# Patient Record
Sex: MALE | Race: WHITE | NOT HISPANIC OR LATINO | Employment: OTHER | URBAN - METROPOLITAN AREA
[De-identification: names, ages, dates, MRNs, and addresses within clinical notes are randomized per-mention and may not be internally consistent; named-entity substitution may affect disease eponyms.]

---

## 2017-02-28 PROBLEM — I38 VHD (VALVULAR HEART DISEASE): Status: ACTIVE | Noted: 2017-02-28

## 2017-02-28 PROBLEM — D69.6 THROMBOCYTOPENIA (HCC): Status: ACTIVE | Noted: 2017-02-28

## 2017-02-28 PROBLEM — J44.9 COPD (CHRONIC OBSTRUCTIVE PULMONARY DISEASE) (HCC): Status: ACTIVE | Noted: 2017-02-28

## 2017-02-28 PROBLEM — I10 HYPERTENSION: Status: ACTIVE | Noted: 2017-02-28

## 2017-02-28 PROBLEM — I48.92 ATRIAL FLUTTER (HCC): Status: ACTIVE | Noted: 2017-02-28

## 2017-02-28 PROBLEM — G47.33 OSA (OBSTRUCTIVE SLEEP APNEA): Status: ACTIVE | Noted: 2017-02-28

## 2017-02-28 PROBLEM — E78.5 DYSLIPIDEMIA: Status: ACTIVE | Noted: 2017-02-28

## 2017-02-28 PROBLEM — Z72.0 TOBACCO ABUSE: Status: ACTIVE | Noted: 2017-02-28

## 2017-02-28 PROBLEM — N18.9 CHRONIC RENAL INSUFFICIENCY: Status: ACTIVE | Noted: 2017-02-28

## 2017-02-28 PROBLEM — I71.40 AAA (ABDOMINAL AORTIC ANEURYSM) (HCC): Status: ACTIVE | Noted: 2017-02-28

## 2017-02-28 PROBLEM — IMO0001 IDDM (INSULIN DEPENDENT DIABETES MELLITUS): Status: ACTIVE | Noted: 2017-02-28

## 2017-02-28 PROBLEM — I25.9 IHD (ISCHEMIC HEART DISEASE): Status: ACTIVE | Noted: 2017-02-28

## 2017-03-23 ENCOUNTER — TELEPHONE (OUTPATIENT)
Dept: CARDIOLOGY | Facility: CLINIC | Age: 72
End: 2017-03-23

## 2017-03-23 DIAGNOSIS — I71.40 ABDOMINAL AORTIC ANEURYSM (AAA) WITHOUT RUPTURE (HCC): Primary | ICD-10-CM

## 2017-03-23 NOTE — TELEPHONE ENCOUNTER
Patient called because he is scheduled for an echo in May before his appt with you. He was wondering if he could also have his AAA checked at the same time? Is this OK?

## 2017-05-09 ENCOUNTER — HOSPITAL ENCOUNTER (OUTPATIENT)
Dept: CT IMAGING | Facility: HOSPITAL | Age: 72
Discharge: HOME | End: 2017-05-09

## 2017-05-09 ENCOUNTER — OFFICE VISIT (OUTPATIENT)
Dept: CARDIOLOGY | Facility: CLINIC | Age: 72
End: 2017-05-09

## 2017-05-09 ENCOUNTER — HOSPITAL ENCOUNTER (OUTPATIENT)
Dept: CARDIOLOGY | Facility: HOSPITAL | Age: 72
Discharge: HOME | End: 2017-05-09
Attending: INTERNAL MEDICINE | Admitting: INTERNAL MEDICINE

## 2017-05-09 VITALS
DIASTOLIC BLOOD PRESSURE: 70 MMHG | HEART RATE: 59 BPM | SYSTOLIC BLOOD PRESSURE: 118 MMHG | WEIGHT: 210.8 LBS | HEIGHT: 67 IN | BODY MASS INDEX: 33.09 KG/M2

## 2017-05-09 VITALS
DIASTOLIC BLOOD PRESSURE: 58 MMHG | BODY MASS INDEX: 32.65 KG/M2 | WEIGHT: 208 LBS | HEIGHT: 67 IN | SYSTOLIC BLOOD PRESSURE: 120 MMHG

## 2017-05-09 DIAGNOSIS — I38 VHD (VALVULAR HEART DISEASE): ICD-10-CM

## 2017-05-09 DIAGNOSIS — I48.3 TYPICAL ATRIAL FLUTTER (HCC): ICD-10-CM

## 2017-05-09 DIAGNOSIS — I71.40 ABDOMINAL AORTIC ANEURYSM (AAA) WITHOUT RUPTURE (HCC): ICD-10-CM

## 2017-05-09 DIAGNOSIS — E78.5 HYPERLIPIDEMIA LDL GOAL <70: ICD-10-CM

## 2017-05-09 DIAGNOSIS — IMO0001 IDDM (INSULIN DEPENDENT DIABETES MELLITUS): ICD-10-CM

## 2017-05-09 DIAGNOSIS — D69.6 THROMBOCYTOPENIA (HCC): ICD-10-CM

## 2017-05-09 DIAGNOSIS — I25.10 CORONARY ARTERY DISEASE INVOLVING NATIVE CORONARY ARTERY OF NATIVE HEART WITHOUT ANGINA PECTORIS: Primary | ICD-10-CM

## 2017-05-09 DIAGNOSIS — I10 ESSENTIAL HYPERTENSION: ICD-10-CM

## 2017-05-09 DIAGNOSIS — I38 VALVULAR HEART DISEASE: ICD-10-CM

## 2017-05-09 LAB
BH CV ECHO MEAS - AO MAX PG (FULL): 20.9 MMHG
BH CV ECHO MEAS - AO MAX PG: 24 MMHG
BH CV ECHO MEAS - AO MEAN PG (FULL): 12 MMHG
BH CV ECHO MEAS - AO MEAN PG: 14 MMHG
BH CV ECHO MEAS - AO ROOT AREA (BSA CORRECTED): 1.4
BH CV ECHO MEAS - AO ROOT AREA: 6.2 CM^2
BH CV ECHO MEAS - AO ROOT DIAM: 2.8 CM
BH CV ECHO MEAS - AO V2 MAX: 245 CM/SEC
BH CV ECHO MEAS - AO V2 MEAN: 177 CM/SEC
BH CV ECHO MEAS - AO V2 VTI: 68.1 CM
BH CV ECHO MEAS - AVA(I,A): 1.2 CM^2
BH CV ECHO MEAS - AVA(I,D): 1.2 CM^2
BH CV ECHO MEAS - AVA(V,A): 1.2 CM^2
BH CV ECHO MEAS - AVA(V,D): 1.2 CM^2
BH CV ECHO MEAS - BSA(HAYCOCK): 2.1 M^2
BH CV ECHO MEAS - BSA: 2.1 M^2
BH CV ECHO MEAS - BZI_BMI: 32.6 KILOGRAMS/M^2
BH CV ECHO MEAS - BZI_METRIC_HEIGHT: 170.2 CM
BH CV ECHO MEAS - BZI_METRIC_WEIGHT: 94.3 KG
BH CV ECHO MEAS - CONTRAST EF (2CH): 55.9 ML/M^2
BH CV ECHO MEAS - CONTRAST EF 4CH: 59.4 ML/M^2
BH CV ECHO MEAS - EDV(CUBED): 174.7 ML
BH CV ECHO MEAS - EDV(MOD-SP2): 127 ML
BH CV ECHO MEAS - EDV(MOD-SP4): 128 ML
BH CV ECHO MEAS - EDV(TEICH): 153 ML
BH CV ECHO MEAS - EF(CUBED): 65.3 %
BH CV ECHO MEAS - EF(MOD-SP2): 55.9 %
BH CV ECHO MEAS - EF(MOD-SP4): 59.4 %
BH CV ECHO MEAS - EF(TEICH): 56.1 %
BH CV ECHO MEAS - ESV(CUBED): 60.7 ML
BH CV ECHO MEAS - ESV(MOD-SP2): 56 ML
BH CV ECHO MEAS - ESV(MOD-SP4): 52 ML
BH CV ECHO MEAS - ESV(TEICH): 67.1 ML
BH CV ECHO MEAS - FS: 29.7 %
BH CV ECHO MEAS - IVS/LVPW: 0.99
BH CV ECHO MEAS - IVSD: 1.3 CM
BH CV ECHO MEAS - LA DIMENSION: 3.5 CM
BH CV ECHO MEAS - LA/AO: 1.3
BH CV ECHO MEAS - LAT PEAK E' VEL: 8.1 CM/SEC
BH CV ECHO MEAS - LV DIASTOLIC VOL/BSA (35-75): 62.3 ML/M^2
BH CV ECHO MEAS - LV MASS(C)D: 304 GRAMS
BH CV ECHO MEAS - LV MASS(C)DI: 147.9 GRAMS/M^2
BH CV ECHO MEAS - LV MAX PG: 3.1 MMHG
BH CV ECHO MEAS - LV MEAN PG: 2 MMHG
BH CV ECHO MEAS - LV SYSTOLIC VOL/BSA (12-30): 25.3 ML/M^2
BH CV ECHO MEAS - LV V1 MAX: 87.9 CM/SEC
BH CV ECHO MEAS - LV V1 MEAN: 58.9 CM/SEC
BH CV ECHO MEAS - LV V1 VTI: 23.9 CM
BH CV ECHO MEAS - LVIDD: 5.6 CM
BH CV ECHO MEAS - LVIDS: 3.9 CM
BH CV ECHO MEAS - LVLD AP2: 8.5 CM
BH CV ECHO MEAS - LVLD AP4: 8.8 CM
BH CV ECHO MEAS - LVLS AP2: 6.7 CM
BH CV ECHO MEAS - LVLS AP4: 6.8 CM
BH CV ECHO MEAS - LVOT AREA (M): 3.5 CM^2
BH CV ECHO MEAS - LVOT AREA: 3.5 CM^2
BH CV ECHO MEAS - LVOT DIAM: 2.1 CM
BH CV ECHO MEAS - LVPWD: 1.3 CM
BH CV ECHO MEAS - MED PEAK E' VEL: 5.55 CM/SEC
BH CV ECHO MEAS - MV A MAX VEL: 126 CM/SEC
BH CV ECHO MEAS - MV E MAX VEL: 161 CM/SEC
BH CV ECHO MEAS - MV E/A: 1.3
BH CV ECHO MEAS - PA ACC SLOPE: 638 CM/SEC^2
BH CV ECHO MEAS - PA ACC TIME: 0.1 SEC
BH CV ECHO MEAS - PA PR(ACCEL): 33.1 MMHG
BH CV ECHO MEAS - RVDD: 2.4 CM
BH CV ECHO MEAS - SI(AO): 203.9 ML/M^2
BH CV ECHO MEAS - SI(CUBED): 55.4 ML/M^2
BH CV ECHO MEAS - SI(LVOT): 40.3 ML/M^2
BH CV ECHO MEAS - SI(MOD-SP2): 34.5 ML/M^2
BH CV ECHO MEAS - SI(MOD-SP4): 37 ML/M^2
BH CV ECHO MEAS - SI(TEICH): 41.8 ML/M^2
BH CV ECHO MEAS - SV(AO): 419.3 ML
BH CV ECHO MEAS - SV(CUBED): 114 ML
BH CV ECHO MEAS - SV(LVOT): 82.8 ML
BH CV ECHO MEAS - SV(MOD-SP2): 71 ML
BH CV ECHO MEAS - SV(MOD-SP4): 76 ML
BH CV ECHO MEAS - SV(TEICH): 85.9 ML
BH CV ECHO MEAS - TAPSE (>1.6): 1.8 CM2
BH CV XLRA - RV BASE: 3.4 CM
BH CV XLRA - RV LENGTH: 8.6 CM
BH CV XLRA - RV MID: 3.4 CM
BH CV XLRA - TDI S': 9.12 CM/SEC
LEFT ATRIUM VOLUME INDEX: 31.6 ML/M2
LEFT ATRIUM VOLUME: 65 CM3
LV EF 2D ECHO EST: 55 %

## 2017-05-09 PROCEDURE — 93306 TTE W/DOPPLER COMPLETE: CPT

## 2017-05-09 PROCEDURE — 82565 ASSAY OF CREATININE: CPT

## 2017-05-09 PROCEDURE — 74170 CT ABD WO CNTRST FLWD CNTRST: CPT

## 2017-05-09 PROCEDURE — 99214 OFFICE O/P EST MOD 30 MIN: CPT | Performed by: NURSE PRACTITIONER

## 2017-05-09 PROCEDURE — 93306 TTE W/DOPPLER COMPLETE: CPT | Performed by: INTERNAL MEDICINE

## 2017-05-09 PROCEDURE — 0 IOPAMIDOL 61 % SOLUTION: Performed by: NURSE PRACTITIONER

## 2017-05-09 RX ORDER — LANCETS 30 GAUGE
EACH MISCELLANEOUS
COMMUNITY
Start: 2017-02-20 | End: 2017-10-18

## 2017-05-09 RX ORDER — UBIDECARENONE 50 MG
CAPSULE ORAL DAILY
Status: ON HOLD | COMMUNITY
End: 2017-09-19

## 2017-05-09 RX ORDER — BLOOD-GLUCOSE METER
KIT MISCELLANEOUS
COMMUNITY
Start: 2017-03-05 | End: 2017-10-18

## 2017-05-09 RX ORDER — INSULIN GLARGINE 100 [IU]/ML
32 INJECTION, SOLUTION SUBCUTANEOUS NIGHTLY
COMMUNITY
Start: 2017-02-20 | End: 2020-01-01 | Stop reason: SDUPTHER

## 2017-05-09 RX ORDER — LISINOPRIL 40 MG/1
40 TABLET ORAL 2 TIMES DAILY
COMMUNITY
Start: 2017-03-05 | End: 2019-02-05 | Stop reason: ALTCHOICE

## 2017-05-09 RX ORDER — HYDROCHLOROTHIAZIDE 25 MG/1
25 TABLET ORAL EVERY MORNING
COMMUNITY
Start: 2017-03-28 | End: 2019-02-05 | Stop reason: SDUPTHER

## 2017-05-09 RX ORDER — FLUTICASONE PROPIONATE 50 MCG
2 SPRAY, SUSPENSION (ML) NASAL NIGHTLY
COMMUNITY
Start: 2017-04-04

## 2017-05-09 RX ORDER — AMLODIPINE BESYLATE 5 MG/1
5 TABLET ORAL 2 TIMES DAILY
COMMUNITY
Start: 2017-03-17 | End: 2019-09-10 | Stop reason: SDUPTHER

## 2017-05-09 RX ORDER — CARVEDILOL 25 MG/1
25 TABLET ORAL 2 TIMES DAILY
COMMUNITY
Start: 2017-04-16 | End: 2019-02-05 | Stop reason: SDUPTHER

## 2017-05-09 RX ADMIN — IOPAMIDOL 100 ML: 612 INJECTION, SOLUTION INTRAVENOUS at 10:00

## 2017-05-10 LAB — CREAT BLDA-MCNC: 1.1 MG/DL (ref 0.6–1.3)

## 2017-09-07 ENCOUNTER — OFFICE VISIT (OUTPATIENT)
Dept: NEUROSURGERY | Facility: CLINIC | Age: 72
End: 2017-09-07

## 2017-09-07 VITALS
TEMPERATURE: 97.7 F | DIASTOLIC BLOOD PRESSURE: 70 MMHG | BODY MASS INDEX: 33.34 KG/M2 | WEIGHT: 212.4 LBS | HEIGHT: 67 IN | SYSTOLIC BLOOD PRESSURE: 140 MMHG

## 2017-09-07 DIAGNOSIS — M48.062 SPINAL STENOSIS, LUMBAR REGION, WITH NEUROGENIC CLAUDICATION: Primary | ICD-10-CM

## 2017-09-07 PROCEDURE — 99204 OFFICE O/P NEW MOD 45 MIN: CPT | Performed by: PHYSICIAN ASSISTANT

## 2017-09-07 RX ORDER — TRAMADOL HYDROCHLORIDE 50 MG/1
50 TABLET ORAL EVERY 8 HOURS PRN
COMMUNITY
End: 2017-10-18

## 2017-09-07 RX ORDER — GABAPENTIN 300 MG/1
300 CAPSULE ORAL 3 TIMES DAILY
Status: ON HOLD | COMMUNITY
End: 2017-09-19 | Stop reason: SINTOL

## 2017-09-07 RX ORDER — METAXALONE 800 MG/1
800 TABLET ORAL 3 TIMES DAILY
COMMUNITY
End: 2017-11-13

## 2017-09-07 RX ORDER — IBUPROFEN 800 MG/1
800 TABLET ORAL EVERY 6 HOURS PRN
Qty: 90 TABLET | Refills: 3 | Status: SHIPPED | OUTPATIENT
Start: 2017-09-07 | End: 2019-09-10

## 2017-09-07 NOTE — PROGRESS NOTES
Patient: Merlin Pritchard  : 1945    Primary Care Provider: Reymundo Le DO      History    Chief Complaint: Lower extremity pain low back pain, lower extremity paresthesias    History of Present Illness: Patient presents with a very interesting history.  Patient saw Dr. Buchanan at  and had a lumbar laminectomy, partial L2 - Partial L5.  Patient presents today with MRI that was prescribed by Dr. Le about 2 weeks ago.  Patient is been dealing with increased low back pain and neurogenic claudication type symptoms for close to 6 months.  Patient has developed recently more lower extremity paresthesias.  Patient denies any bowel or bladder incontinence or numbness in the head of his penis or anus.    Patient states that the pain and is having is reminiscent of bouts of these had for the past 20 years but typically these would get better.  Patient is having pain that radiates from his low back into the back of his legs and he has numbness and tingling down into both feet.  Patient states that when he goes from a seated to standing position that his pain does get worse.  Patient walks with a stooped gait, but seems to be getting around with adequate power.  Patient states that he has weakness, but on my exam.  Majority of this weakness is pain related.  Patient is able to heel-to-toe walk and lift his leg up against gravity bilaterally.  No signs of footdrop or propulsion issues.    Unfortunately, with his MRI without contrast is difficult to delineate what his surgical scar tissue and what is a new disc herniation.  It does seem like the patient may have a disc herniation at L4 5 causing some canal stenosis majority of the left, but this is difficult to discern.  We will need to study this in more depth.    Review of Systems:   Review of Systems   Constitutional: Negative for activity change, appetite change, chills, diaphoresis, fatigue, fever and unexpected weight change.   HENT: Negative for  congestion, dental problem, drooling, ear discharge, ear pain, facial swelling, hearing loss, mouth sores, nosebleeds, postnasal drip, rhinorrhea, sinus pressure, sneezing, sore throat, tinnitus, trouble swallowing and voice change.    Eyes: Negative for photophobia, pain, discharge, redness, itching and visual disturbance.   Respiratory: Negative for apnea, cough, choking, chest tightness, shortness of breath, wheezing and stridor.    Cardiovascular: Positive for leg swelling. Negative for chest pain and palpitations.   Gastrointestinal: Positive for constipation. Negative for abdominal distention, abdominal pain, anal bleeding, blood in stool, diarrhea, nausea, rectal pain and vomiting.   Endocrine: Negative for cold intolerance, heat intolerance, polydipsia, polyphagia and polyuria.   Genitourinary: Negative for decreased urine volume, difficulty urinating, dysuria, enuresis, flank pain, frequency, genital sores, hematuria and urgency.   Musculoskeletal: Positive for arthralgias, back pain, gait problem and myalgias. Negative for joint swelling, neck pain and neck stiffness.   Skin: Negative for color change, pallor, rash and wound.   Allergic/Immunologic: Negative for environmental allergies, food allergies and immunocompromised state.   Neurological: Positive for weakness and numbness. Negative for dizziness, tremors, seizures, syncope, facial asymmetry, speech difficulty, light-headedness and headaches.   Hematological: Negative for adenopathy. Does not bruise/bleed easily.   Psychiatric/Behavioral: Negative for agitation, behavioral problems, confusion, decreased concentration, dysphoric mood, hallucinations, self-injury, sleep disturbance and suicidal ideas. The patient is not nervous/anxious and is not hyperactive.        Past Medical History:     Past Medical History:   Diagnosis Date   • AAA (abdominal aortic aneurysm) 2/28/2017   • Atrial flutter 2/28/2017   • Chronic renal insufficiency 2/28/2017   •  "COPD (chronic obstructive pulmonary disease) 2/28/2017   • Dyslipidemia 2/28/2017   • Hypertension 2/28/2017   • IDDM (insulin dependent diabetes mellitus) 2/28/2017   • IHD (ischemic heart disease) 2/28/2017   • MICHEL (obstructive sleep apnea) 2/28/2017   • Thrombocytopenia 2/28/2017   • VHD (valvular heart disease) 2/28/2017       Family History:     Family History   Problem Relation Age of Onset   • Other Mother      back pain   • Stroke Mother    • Heart disease Father        Social History:    reports that he quit smoking about 35 years ago. He has never used smokeless tobacco. He reports that he drinks about 0.6 oz of alcohol per week  He reports that he does not use illicit drugs.    Surgical History:     Past Surgical History:   Procedure Laterality Date   • APPENDECTOMY     • BACK SURGERY     • BASAL CELL CARCINOMA EXCISION       from face.   • CORONARY ARTERY BYPASS GRAFT  03/22/2004    x4. redo 2-vessel CABG in 2012   • MITRAL VALVE ANNULOPLASTY  04/17/2012   • TONSILLECTOMY AND ADENOIDECTOMY     • VASECTOMY         Allergies:   Crestor [rosuvastatin calcium]; Influenza vaccines; Lipitor [atorvastatin]; Niacin and related; Pravachol [pravastatin sodium]; Tricor [fenofibrate]; and Zocor [simvastatin]    Physical Exam:   /70  Temp 97.7 °F (36.5 °C)  Ht 67\" (170.2 cm)  Wt 212 lb 6.4 oz (96.3 kg)  BMI 33.27 kg/m2  GENEREAL:   The patient is in no acute distress, and is able to answer all questions appropriately.  Prior scar is well-healed, well approximated  HEENT: Pupils are equal and reactive to light.  Visual fields are full.  Extraocular movements are intact without nystagmus.  There is no evidence of trauma.  Neck: Supple without lymphadenopathy  Cardiovascular: Regular rate and rhythm   Abdomen: Soft, non-tender, non-distended.    Musculoskeletal: The patient’s strength is intact in upper and lower extremities upon direct testing.   strength is 5 out of 5 bilaterally. Shoulder abduction is 5 " out of 5.  Dorsiflexion and plantarflexion are equal bilaterally as well as the hip flexion against resistance.  The patient’s gait is stooped without antalgia.  Neurologic: The patient is alert and oriented by 3.  Recent memory, language, attention span, and fund of knowledge are all with within normal limits.  There is no evidence of central motor drift. No facial droop.  No difficulty with rapid alternating movements.  Sensation is equal bilaterally with no deficit.    Reflexes are 1+ at biceps, triceps, brachioradialis, as well as the patellar and Achilles tendon bilaterally.  CRANIAL NERVES:  Cranial nerve II: Review of the fundi demonstrates no edema.  Visual fields are full to confrontation.  Cranial nerves III, IV and VI: PERRLA DC.  Extraocular movements are intact.  Nystagmus is not present.  Cranial nerve V: Visual sensation is intact to light touch.  Cranial nerve VII: Muscles of facial expression revealed no asymmetry.  Cranial nerve VIII: Hearing is intact to finger rub bilaterally.  Cranial nerve IX and X: Palate elevates symmetrically.   Cranial nerve XI: Shoulder shrug is intact.  Cranial nerve XII: Tongue is midline without evidence of Atrophy or fasciculation.        Medical Decision Making    Data Review:   I reviewed the patient's MRI without contrast.  It does show some laminectomy defects throughout the lumbar spine.  Alignment looks to be pretty good without any evident spondylolisthesis, however, there is spinal stenosis at L2 and L5.  Patient also looks like he has a leftward caudally extruded disc herniation on L4 5.  Again, this is a noncontrasted study.  The patient's had a previous surgery and we will need to have better imaging to be able to discern whether this is scar tissue or a new issue.     Diagnosis:   Lumbar spinal stenosis    Treatment Options:   I have decided to get the patient a myelogram and a flexion extension x-ray along with an EMG to further evaluate what is going on.   Patient has progression of paresthesias.  I also is a diabetic.  I want to rule out there is any diabetic neuropathy and at this is solely coming from the back.  I'll also get a flexion extension x-ray to rule out that he has any spondylolisthesis.  And a myelogram to see where the nerve roots her being pinched because these do look like neurogenic claudicatory symptoms.  I will set this up with Dr. spears and see him back appropriately.    With his extensive history of lumbar stenosis.  I have reiterated signs and symptoms of cauda equina that he should come straight to the emergency room if any of these are noticed.  I've also told him that any progressive weakness in his lower extremity should come back and see us in a more timely fashion.  At this point, this is not an urgent matter and we can do with it on outpatient basis.    It has been a pleasure providing neurosurgical care.    Rudy Montes De Oca PA-C      No diagnosis found.

## 2017-09-08 PROBLEM — M48.062 SPINAL STENOSIS, LUMBAR REGION, WITH NEUROGENIC CLAUDICATION: Status: ACTIVE | Noted: 2017-09-08

## 2017-09-14 DIAGNOSIS — M48.062 SPINAL STENOSIS, LUMBAR REGION, WITH NEUROGENIC CLAUDICATION: Primary | ICD-10-CM

## 2017-09-19 ENCOUNTER — APPOINTMENT (OUTPATIENT)
Dept: CT IMAGING | Facility: HOSPITAL | Age: 72
End: 2017-09-19

## 2017-09-19 ENCOUNTER — HOSPITAL ENCOUNTER (OUTPATIENT)
Facility: HOSPITAL | Age: 72
Setting detail: HOSPITAL OUTPATIENT SURGERY
Discharge: HOME | End: 2017-09-19
Attending: RADIOLOGY | Admitting: RADIOLOGY

## 2017-09-19 ENCOUNTER — HOSPITAL ENCOUNTER (OUTPATIENT)
Dept: NEUROLOGY | Facility: HOSPITAL | Age: 72
Discharge: HOME | End: 2017-09-19
Attending: NEUROLOGICAL SURGERY

## 2017-09-19 VITALS
SYSTOLIC BLOOD PRESSURE: 162 MMHG | OXYGEN SATURATION: 98 % | RESPIRATION RATE: 18 BRPM | WEIGHT: 214.95 LBS | TEMPERATURE: 97 F | HEART RATE: 59 BPM | DIASTOLIC BLOOD PRESSURE: 79 MMHG | BODY MASS INDEX: 33.74 KG/M2 | HEIGHT: 67 IN

## 2017-09-19 DIAGNOSIS — M48.062 SPINAL STENOSIS, LUMBAR REGION, WITH NEUROGENIC CLAUDICATION: ICD-10-CM

## 2017-09-19 LAB — GLUCOSE BLDC GLUCOMTR-MCNC: 105 MG/DL (ref 70–130)

## 2017-09-19 PROCEDURE — 95886 MUSC TEST DONE W/N TEST COMP: CPT

## 2017-09-19 PROCEDURE — 62304 MYELOGRAPHY LUMBAR INJECTION: CPT | Performed by: RADIOLOGY

## 2017-09-19 PROCEDURE — 95910 NRV CNDJ TEST 7-8 STUDIES: CPT

## 2017-09-19 PROCEDURE — 72131 CT LUMBAR SPINE W/O DYE: CPT

## 2017-09-19 PROCEDURE — 0 IOPAMIDOL 41 % SOLUTION: Performed by: RADIOLOGY

## 2017-09-19 PROCEDURE — 82962 GLUCOSE BLOOD TEST: CPT

## 2017-09-19 RX ORDER — VITAMIN E 268 MG
400 CAPSULE ORAL EVERY MORNING
COMMUNITY

## 2017-09-19 RX ORDER — NYSTATIN 100000 U/G
1 OINTMENT TOPICAL NIGHTLY
COMMUNITY
End: 2017-10-18

## 2017-09-19 RX ORDER — LIDOCAINE HYDROCHLORIDE 10 MG/ML
INJECTION, SOLUTION INFILTRATION; PERINEURAL AS NEEDED
Status: DISCONTINUED | OUTPATIENT
Start: 2017-09-19 | End: 2017-09-19 | Stop reason: HOSPADM

## 2017-09-19 NOTE — PLAN OF CARE
Problem: Patient Care Overview (Adult)  Goal: Plan of Care Review  Oriented to room upon arrival to [facility]    09/19/17 0800   Coping/Psychosocial Response Interventions   Plan Of Care Reviewed With patient   Patient Care Overview   Progress no change         Problem: Myelogram (Adult)  Goal: Signs and Symptoms of Listed Potential Problems Will be Absent or Manageable (Myelogram)  Outcome: Ongoing (interventions implemented as appropriate)  Procedure teaching done at [facility] per dr. spears    09/19/17 0800   Myelogram   Problems Assessed (Myelogram) other (see comments)   Problems Present (Myelogram) other (see comments)

## 2017-09-21 ENCOUNTER — OFFICE VISIT (OUTPATIENT)
Dept: NEUROSURGERY | Facility: CLINIC | Age: 72
End: 2017-09-21

## 2017-09-21 ENCOUNTER — HOSPITAL ENCOUNTER (OUTPATIENT)
Dept: GENERAL RADIOLOGY | Facility: HOSPITAL | Age: 72
Discharge: HOME | End: 2017-09-21
Attending: NEUROLOGICAL SURGERY | Admitting: NEUROLOGICAL SURGERY

## 2017-09-21 ENCOUNTER — PREP FOR SURGERY (OUTPATIENT)
Dept: OTHER | Facility: HOSPITAL | Age: 72
End: 2017-09-21

## 2017-09-21 ENCOUNTER — HOSPITAL ENCOUNTER (OUTPATIENT)
Dept: GENERAL RADIOLOGY | Facility: HOSPITAL | Age: 72
Discharge: HOME | End: 2017-09-21

## 2017-09-21 VITALS
WEIGHT: 214 LBS | TEMPERATURE: 97.3 F | BODY MASS INDEX: 33.59 KG/M2 | SYSTOLIC BLOOD PRESSURE: 136 MMHG | DIASTOLIC BLOOD PRESSURE: 74 MMHG | HEIGHT: 67 IN

## 2017-09-21 DIAGNOSIS — M48.062 SPINAL STENOSIS, LUMBAR REGION, WITH NEUROGENIC CLAUDICATION: ICD-10-CM

## 2017-09-21 DIAGNOSIS — M48.062 SPINAL STENOSIS, LUMBAR REGION, WITH NEUROGENIC CLAUDICATION: Primary | ICD-10-CM

## 2017-09-21 DIAGNOSIS — I73.9 VASCULAR CLAUDICATION (HCC): ICD-10-CM

## 2017-09-21 PROCEDURE — 73501 X-RAY EXAM HIP UNI 1 VIEW: CPT

## 2017-09-21 PROCEDURE — 99213 OFFICE O/P EST LOW 20 MIN: CPT | Performed by: NEUROLOGICAL SURGERY

## 2017-09-21 PROCEDURE — 72120 X-RAY BEND ONLY L-S SPINE: CPT

## 2017-09-21 NOTE — PROGRESS NOTES
"  NAME: MAGO WAY   DOS: 2017  : 1945  PCP: Provider Not In System    Chief Complaint:  Back and left leg pain  Chief Complaint   Patient presents with   • Back Pain   • Leg Pain   • Myelogram     follow-up       History of Present Illness:  72 y.o. male     Is a gentleman well-known to me.  Seen my PA before for a history of lumbar spinal pain.  He's had a prior multilevel laminectomy.  He has terrible scoliosis.  He did well after his surgery back a number of years ago and most recently since March she's had increasing back and left leg pain he has sacral pain radiating to the bilateral SI joints but his pain is clearly on the left-hand side.  Its associated with mild foot weakness consistent with L5 radiculitis and is not improved despite conservative measures.  He has a significant cardiac history did well with his prior surgeries and denies other issues such as cauda equina syndrome.  PMHX  Allergies:  Allergies   Allergen Reactions   • Crestor [Rosuvastatin Calcium] Myalgia   • Influenza Vaccines Nausea Only     URI   • Lipitor [Atorvastatin] Myalgia   • Niacin And Related Myalgia   • Pravachol [Pravastatin Sodium] Myalgia   • Tricor [Fenofibrate] Myalgia   • Zocor [Simvastatin] Myalgia     Medications    Current Outpatient Prescriptions:   •  amLODIPine (NORVASC) 5 MG tablet, Take 5 mg by mouth 2 (Two) Times a Day., Disp: , Rfl:   •  BD INSULIN SYRINGE ULTRAFINE 31G X 15/64\" 0.5 ML misc, , Disp: , Rfl:   •  carvedilol (COREG) 25 MG tablet, Take 25 mg by mouth 2 (Two) Times a Day., Disp: , Rfl:   •  Coenzyme Q10 300 MG capsule, Take 1 capsule by mouth Every Morning., Disp: , Rfl:   •  fluticasone (FLONASE) 50 MCG/ACT nasal spray, 2 sprays into each nostril Every Night., Disp: , Rfl:   •  FREESTYLE LITE test strip, 6 times daily, Disp: , Rfl:   •  HUMALOG 100 UNIT/ML injection, Inject 10-20 Units under the skin 3 (Three) Times a Day. 5-10 units before each meal., Disp: , Rfl:   •  " hydrochlorothiazide (HYDRODIURIL) 25 MG tablet, Take 25 mg by mouth Every Morning., Disp: , Rfl:   •  ibuprofen (ADVIL,MOTRIN) 800 MG tablet, Take 1 tablet by mouth Every 6 (Six) Hours As Needed for Mild Pain . (Patient taking differently: Take 800 mg by mouth Every 8 (Eight) Hours As Needed for Mild Pain .), Disp: 90 tablet, Rfl: 3  •  Lancets (BD LANCET ULTRAFINE 30G) misc, , Disp: , Rfl:   •  LANTUS 100 UNIT/ML injection, Inject 30 Units under the skin Every Night. BOTTLE, Disp: , Rfl:   •  lisinopril (PRINIVIL,ZESTRIL) 40 MG tablet, Take 40 mg by mouth 2 (Two) Times a Day., Disp: , Rfl:   •  MAGNESIUM CHLORIDE PO, Take 1 capsule by mouth 2 (Two) Times a Day., Disp: , Rfl:   •  metaxalone (SKELAXIN) 800 MG tablet, Take 800 mg by mouth 3 (Three) Times a Day., Disp: , Rfl:   •  nystatin (MYCOSTATIN) 311185 UNIT/GM ointment, Apply 1 application topically Every Night. PERIANAL ITCHING; HC/ZINC, Disp: , Rfl:   •  POTASSIUM CHLORIDE ER PO, Take 1 tablet by mouth Every Morning. OTC, Disp: , Rfl:   •  traMADol (ULTRAM) 50 MG tablet, Take 50 mg by mouth Every 8 (Eight) Hours As Needed., Disp: , Rfl:   •  TURMERIC PO, Take 1 capsule by mouth 3 (Three) Times a Day., Disp: , Rfl:   •  Unable to find, Take 1 each by mouth 2 (Two) Times a Day. Med Name:Billie , Disp: , Rfl:   •  vitamin E 400 UNIT capsule, Take 400 Units by mouth Every Morning., Disp: , Rfl:   Past Medical History:  Past Medical History:   Diagnosis Date   • AAA (abdominal aortic aneurysm) 2/28/2017   • Atrial flutter 2/28/2017   • Chronic renal insufficiency 2/28/2017   • COPD (chronic obstructive pulmonary disease) 2/28/2017   • Dyslipidemia 2/28/2017   • Hypertension 2/28/2017   • IDDM (insulin dependent diabetes mellitus) 2/28/2017   • IHD (ischemic heart disease) 2/28/2017   • MICHEL (obstructive sleep apnea) 2/28/2017   • Thrombocytopenia 2/28/2017   • VHD (valvular heart disease) 2/28/2017     Past Surgical History:  Past Surgical History:   Procedure  Laterality Date   • APPENDECTOMY     • BACK SURGERY     • BASAL CELL CARCINOMA EXCISION       from face.   • CORONARY ARTERY BYPASS GRAFT  03/22/2004    x4. redo 2-vessel CABG in 2012   • INTERVENTIONAL RADIOLOGY PROCEDURE N/A 9/19/2017    Procedure: IR myelogram lumbar spine;  Surgeon: Jay Ellison MD;  Location: Located within [facility] INVASIVE LOCATION;  Service:    • MITRAL VALVE ANNULOPLASTY  04/17/2012   • TONSILLECTOMY AND ADENOIDECTOMY     • VASECTOMY       Social Hx:  Social History   Substance Use Topics   • Smoking status: Former Smoker     Quit date: 5/9/1982   • Smokeless tobacco: Never Used   • Alcohol use 0.6 oz/week     1 Glasses of wine per week      Comment: daily     Family Hx:  Family History   Problem Relation Age of Onset   • Other Mother      back pain   • Stroke Mother    • Heart disease Father      Review of Systems:        Review of Systems   Constitutional: Positive for fatigue. Negative for activity change, appetite change, chills, diaphoresis, fever and unexpected weight change.   HENT: Negative for congestion, dental problem, drooling, ear discharge, ear pain, facial swelling, hearing loss, mouth sores, nosebleeds, postnasal drip, rhinorrhea, sinus pressure, sneezing, sore throat, tinnitus, trouble swallowing and voice change.    Eyes: Negative for photophobia, pain, discharge, redness, itching and visual disturbance.   Respiratory: Positive for shortness of breath. Negative for apnea, cough, choking, chest tightness, wheezing and stridor.    Cardiovascular: Negative for chest pain, palpitations and leg swelling.   Gastrointestinal: Negative for abdominal distention, abdominal pain, anal bleeding, blood in stool, constipation, diarrhea, nausea, rectal pain and vomiting.   Endocrine: Negative for cold intolerance, heat intolerance, polydipsia, polyphagia and polyuria.   Genitourinary: Negative for decreased urine volume, difficulty urinating, dysuria, enuresis, flank pain, frequency, genital  sores, hematuria and urgency.   Musculoskeletal: Negative for arthralgias, back pain, gait problem, joint swelling, myalgias, neck pain and neck stiffness.   Skin: Negative for color change, pallor, rash and wound.   Allergic/Immunologic: Negative for environmental allergies, food allergies and immunocompromised state.   Neurological: Positive for weakness. Negative for dizziness, tremors, seizures, syncope, facial asymmetry, speech difficulty, light-headedness, numbness and headaches.   Hematological: Negative for adenopathy. Does not bruise/bleed easily.   Psychiatric/Behavioral: Negative for agitation, behavioral problems, confusion, decreased concentration, dysphoric mood, hallucinations, self-injury, sleep disturbance and suicidal ideas. The patient is not nervous/anxious and is not hyperactive.         I reviewed the patient's past medical history and review of systems family history social history etc. per the medical record    Physical Examination:  Vitals:    09/21/17 1445   BP: 136/74   Temp: 97.3 °F (36.3 °C)      General Appearance:   Well developed, well nourished, well groomed, alert, and cooperative.  Neurological examination:  Neurologic Exam  Vital signs were reviewed and documented in the chart  Patient appeared in good neurologic function with normal comprehension fluent speech  Mood and affect are normal  Sense of smell deferred    Pupils symmetric equally reactive funduscopic exam not visualized   Visual fields intact to confrontation  Extraocular movements intact  Face motor function is symmetric  Facial sensations normal  Hearing intact to finger rub hearing intact to finger rub  Tongue is midline  Palate symmetric  Swallowing normal  Shoulder shrug normal    Muscle bulk and tone normal  5 out of 5 strength no motor drift  Gait normal intact talus jig gait   almost positive  Romberg  No clonus long tract signs or myelopathy    Reflexes symmetric trace  No edema noted and extremities skin  appears normal  Decreased vibratory sensation bilateral  Straight leg raise sign absent on the left   No signs of intrinsic hip dysfunction some signs of intrinsic hip dysfunction  Back is without any lesions or abnormality bilateral pedal edema present pitting  Feet are warm and well perfused        Review of Imaging/DATA:  Maggy myelogram as well as his MRI he has definite definitive lumbar scoliosis he needs a left-sided discectomy with lamina foraminotomy I didn't see any gross listhesis  Diagnoses/Plan:    Mr. Pritchard is a 72 y.o. male   I discussed his issues with him I think he is a surgical candidate for his left-sided sciatic leg pain he has mild foot drop on the left.  He needs cardiac clearance.  He needs a lumbar flexion extension film as well as hip x-rays just to ensure there is no pathology there likely to be negative.  I think he needs a left-sided lamina foraminotomy we'll have to she had an AP fluoroscopy unit picture given his degree of scoliotic curvature and I explained the risk of recovery to him he may need a lumbar interbody fusion at some point for right now I'd hold that off

## 2017-09-25 ENCOUNTER — TELEPHONE (OUTPATIENT)
Dept: CARDIOLOGY | Facility: CLINIC | Age: 72
End: 2017-09-25

## 2017-09-25 NOTE — TELEPHONE ENCOUNTER
Sent via [employer]    --Contact: [phone]  I have pinched nerves in the lower back, L5 area.  Dr. Buchanan performed a laminectomy on L2-L5 October 2, 2009, and a recent MRI indicates significant pressure on the nerves.  Dr. Buchanan plans to go in and remove some of the vertebra to relieve the pressure.  Since I have Congestive Heart Failure he said I need a clearance from Dr. Miramontes for him to operate.  He is planning on doing this within the next 4 weeks, providing he is given the okay to proceed.  Patricia Novoa is supposed to be coordinating this with your office.  I had a myelogram and a CT Scan on Sept. 19, 2017 at [facility] and those should be available in my records.  The pressure is causing pain in my calves and significantly limiting my activities, so I'm anxious to get this done as soon as possible.      PT cleared to proceed?.      Last Echo 12/22/2016 (In [facility])  No work up since his CABG that I can find

## 2017-10-18 ENCOUNTER — APPOINTMENT (OUTPATIENT)
Dept: PREADMISSION TESTING | Facility: HOSPITAL | Age: 72
End: 2017-10-18

## 2017-10-18 VITALS — HEIGHT: 67 IN | WEIGHT: 209.44 LBS | BODY MASS INDEX: 32.87 KG/M2

## 2017-10-18 DIAGNOSIS — M48.062 SPINAL STENOSIS, LUMBAR REGION, WITH NEUROGENIC CLAUDICATION: ICD-10-CM

## 2017-10-18 LAB
ANION GAP SERPL CALCULATED.3IONS-SCNC: 6 MMOL/L (ref 3–11)
BASOPHILS # BLD AUTO: 0.03 10*3/MM3 (ref 0–0.2)
BASOPHILS NFR BLD AUTO: 0.5 % (ref 0–1)
BUN BLD-MCNC: 17 MG/DL (ref 9–23)
BUN/CREAT SERPL: 18.9 (ref 7–25)
CALCIUM SPEC-SCNC: 9.5 MG/DL (ref 8.7–10.4)
CHLORIDE SERPL-SCNC: 105 MMOL/L (ref 99–109)
CO2 SERPL-SCNC: 28 MMOL/L (ref 20–31)
CREAT BLD-MCNC: 0.9 MG/DL (ref 0.6–1.3)
DEPRECATED RDW RBC AUTO: 43 FL (ref 37–54)
EOSINOPHIL # BLD AUTO: 0.17 10*3/MM3 (ref 0–0.3)
EOSINOPHIL NFR BLD AUTO: 3 % (ref 0–3)
ERYTHROCYTE [DISTWIDTH] IN BLOOD BY AUTOMATED COUNT: 13.3 % (ref 11.3–14.5)
GFR SERPL CREATININE-BSD FRML MDRD: 83 ML/MIN/1.73
GLUCOSE BLD-MCNC: 184 MG/DL (ref 70–100)
HBA1C MFR BLD: 6.4 % (ref 4.8–5.6)
HCT VFR BLD AUTO: 43.9 % (ref 38.9–50.9)
HGB BLD-MCNC: 15.6 G/DL (ref 13.1–17.5)
IMM GRANULOCYTES # BLD: 0.02 10*3/MM3 (ref 0–0.03)
IMM GRANULOCYTES NFR BLD: 0.4 % (ref 0–0.6)
LYMPHOCYTES # BLD AUTO: 1.91 10*3/MM3 (ref 0.6–4.8)
LYMPHOCYTES NFR BLD AUTO: 34.2 % (ref 24–44)
MCH RBC QN AUTO: 31.8 PG (ref 27–31)
MCHC RBC AUTO-ENTMCNC: 35.5 G/DL (ref 32–36)
MCV RBC AUTO: 89.4 FL (ref 80–99)
MONOCYTES # BLD AUTO: 0.56 10*3/MM3 (ref 0–1)
MONOCYTES NFR BLD AUTO: 10 % (ref 0–12)
NEUTROPHILS # BLD AUTO: 2.89 10*3/MM3 (ref 1.5–8.3)
NEUTROPHILS NFR BLD AUTO: 51.9 % (ref 41–71)
PLATELET # BLD AUTO: 92 10*3/MM3 (ref 150–450)
PMV BLD AUTO: 11.5 FL (ref 6–12)
POTASSIUM BLD-SCNC: 4.4 MMOL/L (ref 3.5–5.5)
RBC # BLD AUTO: 4.91 10*6/MM3 (ref 4.2–5.76)
SODIUM BLD-SCNC: 139 MMOL/L (ref 132–146)
WBC NRBC COR # BLD: 5.58 10*3/MM3 (ref 3.5–10.8)

## 2017-10-18 PROCEDURE — 80048 BASIC METABOLIC PNL TOTAL CA: CPT | Performed by: NEUROLOGICAL SURGERY

## 2017-10-18 PROCEDURE — 87081 CULTURE SCREEN ONLY: CPT | Performed by: ANESTHESIOLOGY

## 2017-10-18 PROCEDURE — 93010 ELECTROCARDIOGRAM REPORT: CPT | Performed by: INTERNAL MEDICINE

## 2017-10-18 PROCEDURE — 93005 ELECTROCARDIOGRAM TRACING: CPT

## 2017-10-18 PROCEDURE — 83036 HEMOGLOBIN GLYCOSYLATED A1C: CPT | Performed by: ANESTHESIOLOGY

## 2017-10-18 PROCEDURE — 85025 COMPLETE CBC W/AUTO DIFF WBC: CPT | Performed by: NEUROLOGICAL SURGERY

## 2017-10-18 PROCEDURE — 36415 COLL VENOUS BLD VENIPUNCTURE: CPT

## 2017-10-18 RX ORDER — TRAMADOL HYDROCHLORIDE 50 MG/1
50 TABLET ORAL 3 TIMES DAILY PRN
COMMUNITY
End: 2017-11-13

## 2017-10-18 NOTE — PAT
Measured for TEDS/SCDS in PAT-     calf measurement      15    Length measurement  19.5    Prescription given to patient and explained.

## 2017-10-20 LAB — MRSA SPEC QL CULT: NORMAL

## 2017-10-24 ENCOUNTER — ANESTHESIA EVENT (OUTPATIENT)
Dept: PERIOP | Facility: HOSPITAL | Age: 72
End: 2017-10-24

## 2017-10-24 RX ORDER — FAMOTIDINE 10 MG/ML
20 INJECTION, SOLUTION INTRAVENOUS ONCE
Status: CANCELLED | OUTPATIENT
Start: 2017-10-24 | End: 2017-10-24

## 2017-10-25 ENCOUNTER — APPOINTMENT (OUTPATIENT)
Dept: GENERAL RADIOLOGY | Facility: HOSPITAL | Age: 72
End: 2017-10-25

## 2017-10-25 ENCOUNTER — ANESTHESIA (OUTPATIENT)
Dept: PERIOP | Facility: HOSPITAL | Age: 72
End: 2017-10-25

## 2017-10-25 ENCOUNTER — HOSPITAL ENCOUNTER (OUTPATIENT)
Facility: HOSPITAL | Age: 72
Discharge: HOME | End: 2017-10-25
Attending: NEUROLOGICAL SURGERY | Admitting: NEUROLOGICAL SURGERY

## 2017-10-25 VITALS
DIASTOLIC BLOOD PRESSURE: 77 MMHG | TEMPERATURE: 98 F | HEART RATE: 50 BPM | OXYGEN SATURATION: 100 % | RESPIRATION RATE: 18 BRPM | SYSTOLIC BLOOD PRESSURE: 146 MMHG

## 2017-10-25 PROBLEM — M48.062 LUMBAR STENOSIS WITH NEUROGENIC CLAUDICATION: Status: ACTIVE | Noted: 2017-10-25

## 2017-10-25 LAB
GLUCOSE BLDC GLUCOMTR-MCNC: 157 MG/DL (ref 70–130)
GLUCOSE BLDC GLUCOMTR-MCNC: 177 MG/DL (ref 70–130)
POTASSIUM BLDA-SCNC: 4.15 MMOL/L (ref 3.5–5.3)

## 2017-10-25 PROCEDURE — A9270 NON-COVERED ITEM OR SERVICE: HCPCS | Performed by: NEUROLOGICAL SURGERY

## 2017-10-25 PROCEDURE — 63710000001 ACETAMINOPHEN 325 MG TABLET: Performed by: NEUROLOGICAL SURGERY

## 2017-10-25 PROCEDURE — 25010000002 ONDANSETRON PER 1 MG: Performed by: NURSE ANESTHETIST, CERTIFIED REGISTERED

## 2017-10-25 PROCEDURE — 84132 ASSAY OF SERUM POTASSIUM: CPT | Performed by: ANESTHESIOLOGY

## 2017-10-25 PROCEDURE — 76000 FLUOROSCOPY <1 HR PHYS/QHP: CPT

## 2017-10-25 PROCEDURE — 63047 LAM FACETEC & FORAMOT LUMBAR: CPT | Performed by: NEUROLOGICAL SURGERY

## 2017-10-25 PROCEDURE — A9270 NON-COVERED ITEM OR SERVICE: HCPCS | Performed by: ANESTHESIOLOGY

## 2017-10-25 PROCEDURE — 25010000003 CEFAZOLIN IN DEXTROSE 2-4 GM/100ML-% SOLUTION: Performed by: NEUROLOGICAL SURGERY

## 2017-10-25 PROCEDURE — 25010000002 NEOSTIGMINE PER 0.5 MG: Performed by: NURSE ANESTHETIST, CERTIFIED REGISTERED

## 2017-10-25 PROCEDURE — 63710000001 FAMOTIDINE 20 MG TABLET: Performed by: ANESTHESIOLOGY

## 2017-10-25 PROCEDURE — 25010000002 PROPOFOL 10 MG/ML EMULSION: Performed by: NURSE ANESTHETIST, CERTIFIED REGISTERED

## 2017-10-25 PROCEDURE — 82962 GLUCOSE BLOOD TEST: CPT

## 2017-10-25 PROCEDURE — 63710000001 HYDROCODONE-ACETAMINOPHEN 7.5-325 MG TABLET: Performed by: NEUROLOGICAL SURGERY

## 2017-10-25 PROCEDURE — 63710000001 IBUPROFEN 800 MG TABLET: Performed by: NEUROLOGICAL SURGERY

## 2017-10-25 DEVICE — SEALANT WND FIBRIN TISSEEL VAPOR/HEAT/PREFIL/SYR 10ML: Type: IMPLANTABLE DEVICE | Status: FUNCTIONAL

## 2017-10-25 RX ORDER — FENTANYL CITRATE 50 UG/ML
50 INJECTION, SOLUTION INTRAMUSCULAR; INTRAVENOUS
Status: DISCONTINUED | OUTPATIENT
Start: 2017-10-25 | End: 2017-10-25 | Stop reason: HOSPADM

## 2017-10-25 RX ORDER — LIDOCAINE HYDROCHLORIDE 10 MG/ML
INJECTION, SOLUTION INFILTRATION; PERINEURAL AS NEEDED
Status: DISCONTINUED | OUTPATIENT
Start: 2017-10-25 | End: 2017-10-25 | Stop reason: SURG

## 2017-10-25 RX ORDER — ACETAMINOPHEN 325 MG/1
650 TABLET ORAL ONCE
Status: COMPLETED | OUTPATIENT
Start: 2017-10-25 | End: 2017-10-25

## 2017-10-25 RX ORDER — ONDANSETRON 2 MG/ML
INJECTION INTRAMUSCULAR; INTRAVENOUS AS NEEDED
Status: DISCONTINUED | OUTPATIENT
Start: 2017-10-25 | End: 2017-10-25 | Stop reason: SURG

## 2017-10-25 RX ORDER — IBUPROFEN 800 MG/1
800 TABLET ORAL ONCE
Status: COMPLETED | OUTPATIENT
Start: 2017-10-25 | End: 2017-10-25

## 2017-10-25 RX ORDER — SODIUM CHLORIDE, SODIUM LACTATE, POTASSIUM CHLORIDE, CALCIUM CHLORIDE 600; 310; 30; 20 MG/100ML; MG/100ML; MG/100ML; MG/100ML
9 INJECTION, SOLUTION INTRAVENOUS CONTINUOUS
Status: DISCONTINUED | OUTPATIENT
Start: 2017-10-25 | End: 2017-10-25 | Stop reason: HOSPADM

## 2017-10-25 RX ORDER — FIBRINOGEN HUMAN AND THROMBIN HUMAN 90; 500 [IU]/ML; [IU]/ML
SOLUTION TOPICAL AS NEEDED
Status: DISCONTINUED | OUTPATIENT
Start: 2017-10-25 | End: 2017-10-25 | Stop reason: HOSPADM

## 2017-10-25 RX ORDER — PROMETHAZINE HYDROCHLORIDE 25 MG/ML
6.25 INJECTION, SOLUTION INTRAMUSCULAR; INTRAVENOUS ONCE AS NEEDED
Status: DISCONTINUED | OUTPATIENT
Start: 2017-10-25 | End: 2017-10-25 | Stop reason: HOSPADM

## 2017-10-25 RX ORDER — HYDROCODONE BITARTRATE AND ACETAMINOPHEN 7.5; 325 MG/1; MG/1
1 TABLET ORAL ONCE AS NEEDED
Status: DISCONTINUED | OUTPATIENT
Start: 2017-10-25 | End: 2017-10-25 | Stop reason: HOSPADM

## 2017-10-25 RX ORDER — HYDROCODONE BITARTRATE AND ACETAMINOPHEN 7.5; 325 MG/1; MG/1
1 TABLET ORAL ONCE
Status: COMPLETED | OUTPATIENT
Start: 2017-10-25 | End: 2017-10-25

## 2017-10-25 RX ORDER — HYDROCODONE BITARTRATE AND ACETAMINOPHEN 7.5; 325 MG/1; MG/1
1-2 TABLET ORAL EVERY 6 HOURS PRN
Qty: 45 TABLET | Refills: 0 | Status: SHIPPED | OUTPATIENT
Start: 2017-10-25 | End: 2018-05-29

## 2017-10-25 RX ORDER — PROMETHAZINE HYDROCHLORIDE 25 MG/1
25 SUPPOSITORY RECTAL ONCE AS NEEDED
Status: DISCONTINUED | OUTPATIENT
Start: 2017-10-25 | End: 2017-10-25 | Stop reason: HOSPADM

## 2017-10-25 RX ORDER — FAMOTIDINE 20 MG/1
20 TABLET, FILM COATED ORAL ONCE
Status: COMPLETED | OUTPATIENT
Start: 2017-10-25 | End: 2017-10-25

## 2017-10-25 RX ORDER — ATRACURIUM BESYLATE 10 MG/ML
INJECTION, SOLUTION INTRAVENOUS AS NEEDED
Status: DISCONTINUED | OUTPATIENT
Start: 2017-10-25 | End: 2017-10-25 | Stop reason: SURG

## 2017-10-25 RX ORDER — PROMETHAZINE HYDROCHLORIDE 25 MG/1
25 TABLET ORAL ONCE AS NEEDED
Status: DISCONTINUED | OUTPATIENT
Start: 2017-10-25 | End: 2017-10-25 | Stop reason: HOSPADM

## 2017-10-25 RX ORDER — SODIUM CHLORIDE 0.9 % (FLUSH) 0.9 %
1-10 SYRINGE (ML) INJECTION AS NEEDED
Status: DISCONTINUED | OUTPATIENT
Start: 2017-10-25 | End: 2017-10-25 | Stop reason: HOSPADM

## 2017-10-25 RX ORDER — HYDROMORPHONE HYDROCHLORIDE 1 MG/ML
0.25 INJECTION, SOLUTION INTRAMUSCULAR; INTRAVENOUS; SUBCUTANEOUS
Status: DISCONTINUED | OUTPATIENT
Start: 2017-10-25 | End: 2017-10-25 | Stop reason: HOSPADM

## 2017-10-25 RX ORDER — LIDOCAINE HYDROCHLORIDE 10 MG/ML
0.5 INJECTION, SOLUTION EPIDURAL; INFILTRATION; INTRACAUDAL; PERINEURAL ONCE AS NEEDED
Status: COMPLETED | OUTPATIENT
Start: 2017-10-25 | End: 2017-10-25

## 2017-10-25 RX ORDER — MAGNESIUM HYDROXIDE 1200 MG/15ML
LIQUID ORAL AS NEEDED
Status: DISCONTINUED | OUTPATIENT
Start: 2017-10-25 | End: 2017-10-25 | Stop reason: HOSPADM

## 2017-10-25 RX ORDER — PROPOFOL 10 MG/ML
VIAL (ML) INTRAVENOUS AS NEEDED
Status: DISCONTINUED | OUTPATIENT
Start: 2017-10-25 | End: 2017-10-25 | Stop reason: SURG

## 2017-10-25 RX ORDER — BUPIVACAINE HYDROCHLORIDE 2.5 MG/ML
INJECTION, SOLUTION EPIDURAL; INFILTRATION; INTRACAUDAL AS NEEDED
Status: DISCONTINUED | OUTPATIENT
Start: 2017-10-25 | End: 2017-10-25 | Stop reason: HOSPADM

## 2017-10-25 RX ORDER — GLYCOPYRROLATE 0.2 MG/ML
INJECTION INTRAMUSCULAR; INTRAVENOUS AS NEEDED
Status: DISCONTINUED | OUTPATIENT
Start: 2017-10-25 | End: 2017-10-25 | Stop reason: SURG

## 2017-10-25 RX ORDER — CEFAZOLIN SODIUM 2 G/100ML
2 INJECTION, SOLUTION INTRAVENOUS ONCE
Status: COMPLETED | OUTPATIENT
Start: 2017-10-25 | End: 2017-10-25

## 2017-10-25 RX ADMIN — GLYCOPYRROLATE 0.4 MG: 0.2 INJECTION, SOLUTION INTRAMUSCULAR; INTRAVENOUS at 12:33

## 2017-10-25 RX ADMIN — LIDOCAINE HYDROCHLORIDE 100 MG: 10 INJECTION, SOLUTION INFILTRATION; PERINEURAL at 10:54

## 2017-10-25 RX ADMIN — FAMOTIDINE 20 MG: 20 TABLET, FILM COATED ORAL at 07:54

## 2017-10-25 RX ADMIN — EPHEDRINE SULFATE 10 MG: 50 INJECTION INTRAMUSCULAR; INTRAVENOUS; SUBCUTANEOUS at 11:18

## 2017-10-25 RX ADMIN — IBUPROFEN 800 MG: 800 TABLET ORAL at 10:36

## 2017-10-25 RX ADMIN — CEFAZOLIN SODIUM 2 G: 2 INJECTION, SOLUTION INTRAVENOUS at 10:59

## 2017-10-25 RX ADMIN — HYDROCODONE BITARTRATE AND ACETAMINOPHEN 1 TABLET: 7.5; 325 TABLET ORAL at 10:36

## 2017-10-25 RX ADMIN — PROPOFOL 150 MG: 10 INJECTION, EMULSION INTRAVENOUS at 10:54

## 2017-10-25 RX ADMIN — LIDOCAINE HYDROCHLORIDE 0.5 ML: 10 INJECTION, SOLUTION EPIDURAL; INFILTRATION; INTRACAUDAL; PERINEURAL at 07:54

## 2017-10-25 RX ADMIN — EPHEDRINE SULFATE 10 MG: 50 INJECTION INTRAMUSCULAR; INTRAVENOUS; SUBCUTANEOUS at 11:30

## 2017-10-25 RX ADMIN — ONDANSETRON 4 MG: 2 INJECTION INTRAMUSCULAR; INTRAVENOUS at 12:27

## 2017-10-25 RX ADMIN — ACETAMINOPHEN 650 MG: 325 TABLET ORAL at 10:36

## 2017-10-25 RX ADMIN — ATRACURIUM BESYLATE 35 MG: 10 INJECTION, SOLUTION INTRAVENOUS at 10:54

## 2017-10-25 RX ADMIN — SODIUM CHLORIDE, POTASSIUM CHLORIDE, SODIUM LACTATE AND CALCIUM CHLORIDE 9 ML/HR: 600; 310; 30; 20 INJECTION, SOLUTION INTRAVENOUS at 07:54

## 2017-10-25 RX ADMIN — Medication 2.5 MG: at 12:33

## 2017-10-25 NOTE — ANESTHESIA PROCEDURE NOTES
Airway  Urgency: elective    Airway not difficult    General Information and Staff    Patient location during procedure: OR  CRNA: MIRTHA TORRES    Indications and Patient Condition  Indications for airway management: airway protection    Preoxygenated: yes  MILS maintained throughout  Mask difficulty assessment: 2 - vent by mask + OA or adjuvant +/- NMBA    Final Airway Details  Final airway type: endotracheal airway      Successful airway: ETT  Cuffed: yes   Successful intubation technique: direct laryngoscopy  Facilitating devices/methods: Bougie and cricoid pressure (Cricoid pressure for view)  Endotracheal tube insertion site: oral  Blade: Blair  Blade size: #2  ETT size: 7.5 mm  Cormack-Lehane Classification: grade III - view of epiglottis only  Placement verified by: chest auscultation and capnometry   Cuff volume (mL): 5  Measured from: lips  ETT to lips (cm): 20  Number of attempts at approach: 2    Additional Comments  Pt to OR 19.  Pt supine on stretcher, bilateral arms to side. ASA monitors placed. Pre-O2 with 100% Oxygen. SIVI.DLX2. Pt very anterior view and stiff neck.  Atraumatic intubation.

## 2017-10-25 NOTE — ANESTHESIA PREPROCEDURE EVALUATION
Anesthesia Evaluation     Patient summary reviewed and Nursing notes reviewed   NPO Solid Status: > 8 hours  NPO Liquid Status: > 8 hours     Airway   Mallampati: I  TM distance: <3 FB  Neck ROM: full  no difficulty expected  Dental - normal exam     Pulmonary - normal exam   (+) COPD, shortness of breath, sleep apnea,   Cardiovascular - normal exam    (+) hypertension, valvular problems/murmurs (H/O MITRAL REPAIR FOR MR), past MI , CAD, CABG, dysrhythmias Atrial Flutter, CHF, PVD (AAA), hyperlipidemia      Neuro/Psych  (+) numbness,    GI/Hepatic/Renal/Endo    (+)  renal disease CRI, diabetes mellitus,     Musculoskeletal     (+) back pain,   Abdominal  - normal exam    Bowel sounds: normal.   Substance History - negative use     OB/GYN negative ob/gyn ROS         Other      history of cancer                                    Anesthesia Plan    ASA 3     general     intravenous induction   Anesthetic plan and risks discussed with patient.    Plan discussed with CRNA.

## 2017-10-25 NOTE — OP NOTE
Operation note      Preoperative diagnosis left-sided symptoms of neurogenic claudication and prior laminectomy    Postoperative diagnosis same    Procedures performed L4 revision laminectomy superior left L5 lamina foraminotomy, L4 5 microdiscectomy      Surgeon: Justin Buchanan MD     Assistants: Rudy Montes De Oca    Anesthesia: Normal endotracheal anesthesia    ASA Class: 2    Findings hard disc calcification with  cartilaginous disc and severe lateral recess stenosis as well as copious scar tissue and ectopic bone formation  Complications none    10 cc blood loss microscope used    Procedure in detail after formal written consent was obtained the patient was taken to the operating room endotracheally intubated given preoperative antimicrobial prophylaxis they were prepped and draped in the usual sterile manner all bony prominences and genitalia were padded to prevent neurologic injury.    At this point in time the patient was given local anesthesia at the operative level fluoroscopic guidance confirmed as 45 the correct level and small open midline incision was made with dissection down to the bilateral facet complexes.    A drill was then used to remove the posterior dorsal ligamentous bony structures with partial medial drilling of the facet joints.  This was eggshell thin and then using accommodation and curettes it was removed.  Attention was then turned to the superior lateral portion of the L5 lamina were using high-speed bur this was also sent in total isolation of the exiting L5 nerve root was noted this was gently retracted there was a disc mound under this which was incised and removed yielding copious disc fragments.  At this point time the area is copiously irrigated and meticulous hemostasis maintained due to the dense adherent scar tissue no other areas were explored.    At this point in time after incision of the disc, a multiple small fragment as well as cartilaginous fragment were noted and  adequate neural decompression was confirmed.    At the resolution the case meticulous hemostasis was maintained and in the incision was closed in layers I was present personally performed the entirety of the proce  At the resolution the case meticulous hemostasis was maintained and in the incision was closed in layers I was present personally performed the entirety of the procedure until the skin closure.

## 2017-10-25 NOTE — ANESTHESIA POSTPROCEDURE EVALUATION
Patient: Merlin Pritchard    Procedure Summary     Date Anesthesia Start Anesthesia Stop Room / Location    10/25/17 1049  [facility] / [facility]       Procedure Diagnosis Surgeon Provider    lumbar foraminotomy L4 5 (N/A Spine Lumbar) Spinal stenosis, lumbar region, with neurogenic claudication  (Spinal stenosis, lumbar region, with neurogenic claudication [M48.06]) MD John Gilman MD          Anesthesia Type: general  Last vitals 10/25/17 @1301  /69      Temp 98.4      Pulse 60      Resp 14        SpO2    97%     Post Anesthesia Care and Evaluation    Patient location during evaluation: PACU  Patient participation: complete - patient cannot participate  Level of consciousness: responsive to physical stimuli  Pain score: 0  Pain management: adequate  Airway patency: patent  Anesthetic complications: No anesthetic complications    Cardiovascular status: stable  Respiratory status: acceptable, nasal cannula, oral airway and spontaneous ventilation  Hydration status: stable    Comments: Pt transferred to PACU with O2. Vital signs stable. Report to PACU RN and care accepted.

## 2017-11-13 ENCOUNTER — OFFICE VISIT (OUTPATIENT)
Dept: NEUROSURGERY | Facility: CLINIC | Age: 72
End: 2017-11-13

## 2017-11-13 VITALS
SYSTOLIC BLOOD PRESSURE: 130 MMHG | BODY MASS INDEX: 32.65 KG/M2 | TEMPERATURE: 97.4 F | HEIGHT: 67 IN | DIASTOLIC BLOOD PRESSURE: 70 MMHG | WEIGHT: 208 LBS

## 2017-11-13 DIAGNOSIS — M48.062 SPINAL STENOSIS, LUMBAR REGION, WITH NEUROGENIC CLAUDICATION: ICD-10-CM

## 2017-11-13 DIAGNOSIS — Z98.890 S/P LUMBAR LAMINECTOMY: Primary | ICD-10-CM

## 2017-11-13 PROCEDURE — 99024 POSTOP FOLLOW-UP VISIT: CPT | Performed by: PHYSICIAN ASSISTANT

## 2017-11-13 NOTE — PROGRESS NOTES
Subjective   Patient ID: Merlin Pritchard is a 72 y.o. male is here today for follow-up for staple removal.    HPI:      Patient underwent a revision laminectomy and discectomy on 10/25/2017.  Patient has gotten a lot of relief of the symptomatology was complaining about prior to surgery.  Patient states that his numbness in his genital area has gotten much better and some of his weakness in his left dorsiflexor has improved as well.  Patient does not have the low back pain or the neurogenic claudicatory symptoms and prior to surgery.  Patient continues to have an awful-looking spine with sagittal scoliosis and foraminal impingement at multiple levels, but it seems that we hit the nail on the head.  As far as symptomatology goes.  Patient has no low back pain and no radicular pain into the legs.  Patient is very pleased postsurgical outcome and cannot have asked for a better outcome.    The following portions of the patient's history were reviewed and updated as appropriate: allergies, current medications, past family history, past medical history, past social history, past surgical history and problem list.    Review of Systems   Constitutional: Negative for activity change, appetite change, chills, diaphoresis, fatigue, fever and unexpected weight change.   HENT: Negative for congestion, dental problem, drooling, ear discharge, ear pain, facial swelling, hearing loss, mouth sores, nosebleeds, postnasal drip, rhinorrhea, sinus pressure, sneezing, sore throat, tinnitus, trouble swallowing and voice change.    Eyes: Negative for photophobia, pain, discharge, redness, itching and visual disturbance.   Respiratory: Negative for apnea, cough, choking, chest tightness, shortness of breath, wheezing and stridor.    Cardiovascular: Negative for chest pain, palpitations and leg swelling.   Gastrointestinal: Negative for abdominal distention, abdominal pain, anal bleeding, blood in stool, constipation, diarrhea, nausea,  rectal pain and vomiting.   Endocrine: Negative for cold intolerance, heat intolerance, polydipsia, polyphagia and polyuria.   Genitourinary: Negative for decreased urine volume, difficulty urinating, dysuria, enuresis, flank pain, frequency, genital sores, hematuria and urgency.   Musculoskeletal: Positive for back pain. Negative for arthralgias, gait problem, joint swelling, myalgias, neck pain and neck stiffness.   Skin: Negative for color change, pallor, rash and wound.   Allergic/Immunologic: Negative for environmental allergies, food allergies and immunocompromised state.   Neurological: Negative for dizziness, tremors, seizures, syncope, facial asymmetry, speech difficulty, weakness, light-headedness, numbness and headaches.   Hematological: Negative for adenopathy. Does not bruise/bleed easily.   Psychiatric/Behavioral: Negative for agitation, behavioral problems, confusion, decreased concentration, dysphoric mood, hallucinations, self-injury, sleep disturbance and suicidal ideas. The patient is not nervous/anxious and is not hyperactive.    All other systems reviewed and are negative.        Objective     Physical Exam   GENEREAL:   The patient is in no acute distress, and is able to answer all questions appropriately.  Skin:  The incision is well-healed and well approximated.  No signs of infection, bleeding, or erythema.  Musculoskeletal: The patient’s strength is intact in upper and lower extremities upon direct testing.  Dorsiflexion and plantarflexion are equal bilaterally @ 5/5. Hip flexion against resistance.  The patient’s gait is normal without antalgia.  Neurologic: The patient is alert and oriented by 3.  Recent memory, language, attention span, and fund of knowledge are all with within normal limits.   Sensation is equal bilaterally with no deficit.    Reflexes are 2+ at the patellar and Achilles tendon bilaterally.      Assessment/Plan   Independent Review of Radiographic Studies:    No films  reviewed at this visit  Medical Decision Making:    Patient will see us back in 6 weeks following physical therapy.  I have put in for an order of physical therapy so patient can continue his cardiac rehabilitation.  Patient will call with any other questions or concerns.    It has been a pleasure providing neurosurgical care.    Rudy Montes De Oca PA-C  There are no diagnoses linked to this encounter.  No Follow-up on file.

## 2018-05-11 ENCOUNTER — TELEPHONE (OUTPATIENT)
Dept: CARDIOLOGY | Facility: CLINIC | Age: 73
End: 2018-05-11

## 2018-05-11 DIAGNOSIS — I25.119 CORONARY ARTERY DISEASE INVOLVING NATIVE CORONARY ARTERY OF NATIVE HEART WITH ANGINA PECTORIS (HCC): ICD-10-CM

## 2018-05-11 DIAGNOSIS — I49.3 PREMATURE VENTRICULAR CONTRACTIONS: Primary | ICD-10-CM

## 2018-05-11 NOTE — TELEPHONE ENCOUNTER
I spoke with patient on the phone. He recently was seen by his PCP and related that he was having low heart rate readings on his pulse oximeter i.e. 30 BPM. A 24-hour Holter monitor was placed. The patient had an average heart rate of 67 BPM with 1 nocturnal pause of 2 seconds. However, there was frequent ventricular ectopy accounting 20% of total beats noted.    The patient states that he has had PVCs in the past but never to this degree. He feels tired when he exercises but denies any worsening chest pain symptoms    I recommend he undergo stress testing and echo. If unremarkable, we can plan to see the patient at his upcoming appointment in 6/12/2018.    Cordell Miramontes IV, MD  5/11/2018

## 2018-05-21 PROBLEM — N18.9 CHRONIC RENAL INSUFFICIENCY: Status: RESOLVED | Noted: 2017-02-28 | Resolved: 2018-05-21

## 2018-05-22 ENCOUNTER — HOSPITAL ENCOUNTER (OUTPATIENT)
Dept: CARDIOLOGY | Facility: HOSPITAL | Age: 73
Discharge: HOME | End: 2018-05-22
Attending: INTERNAL MEDICINE

## 2018-05-22 DIAGNOSIS — I49.3 PREMATURE VENTRICULAR CONTRACTIONS: ICD-10-CM

## 2018-05-22 DIAGNOSIS — I25.119 CORONARY ARTERY DISEASE INVOLVING NATIVE CORONARY ARTERY OF NATIVE HEART WITH ANGINA PECTORIS (HCC): ICD-10-CM

## 2018-05-22 LAB
BH CV ECHO MEAS - AO MAX PG (FULL): 28.4 MMHG
BH CV ECHO MEAS - AO MAX PG: 31.6 MMHG
BH CV ECHO MEAS - AO MEAN PG (FULL): 16.7 MMHG
BH CV ECHO MEAS - AO MEAN PG: 18.4 MMHG
BH CV ECHO MEAS - AO ROOT AREA (BSA CORRECTED): 1.4
BH CV ECHO MEAS - AO ROOT AREA: 6.1 CM^2
BH CV ECHO MEAS - AO ROOT DIAM: 2.8 CM
BH CV ECHO MEAS - AO V2 MAX: 281 CM/SEC
BH CV ECHO MEAS - AO V2 MEAN: 201.8 CM/SEC
BH CV ECHO MEAS - AO V2 VTI: 75.3 CM
BH CV ECHO MEAS - AVA(I,A): 0.77 CM^2
BH CV ECHO MEAS - AVA(I,D): 0.77 CM^2
BH CV ECHO MEAS - AVA(V,A): 0.84 CM^2
BH CV ECHO MEAS - AVA(V,D): 0.84 CM^2
BH CV ECHO MEAS - BSA(HAYCOCK): 2.1 M^2
BH CV ECHO MEAS - BSA: 2 M^2
BH CV ECHO MEAS - BZI_BMI: 33.6 KILOGRAMS/M^2
BH CV ECHO MEAS - BZI_METRIC_HEIGHT: 167.6 CM
BH CV ECHO MEAS - BZI_METRIC_WEIGHT: 94.3 KG
BH CV ECHO MEAS - CONTRAST EF (2CH): 56 ML/M^2
BH CV ECHO MEAS - CONTRAST EF 4CH: 53.7 ML/M^2
BH CV ECHO MEAS - EDV(CUBED): 140.1 ML
BH CV ECHO MEAS - EDV(MOD-SP2): 109 ML
BH CV ECHO MEAS - EDV(MOD-SP4): 123 ML
BH CV ECHO MEAS - EDV(TEICH): 129.1 ML
BH CV ECHO MEAS - EF(CUBED): 62.4 %
BH CV ECHO MEAS - EF(MOD-SP2): 56 %
BH CV ECHO MEAS - EF(MOD-SP4): 53.7 %
BH CV ECHO MEAS - EF(TEICH): 53.5 %
BH CV ECHO MEAS - ESV(CUBED): 52.7 ML
BH CV ECHO MEAS - ESV(MOD-SP2): 48 ML
BH CV ECHO MEAS - ESV(MOD-SP4): 57 ML
BH CV ECHO MEAS - ESV(TEICH): 60 ML
BH CV ECHO MEAS - FS: 27.8 %
BH CV ECHO MEAS - IVS/LVPW: 0.99
BH CV ECHO MEAS - IVSD: 1.5 CM
BH CV ECHO MEAS - LA DIMENSION: 5 CM
BH CV ECHO MEAS - LA/AO: 1.8
BH CV ECHO MEAS - LAT PEAK E' VEL: 5.9 CM/SEC
BH CV ECHO MEAS - LV DIASTOLIC VOL/BSA (35-75): 60.5 ML/M^2
BH CV ECHO MEAS - LV MASS(C)D: 346.6 GRAMS
BH CV ECHO MEAS - LV MASS(C)DI: 170.4 GRAMS/M^2
BH CV ECHO MEAS - LV MAX PG: 3.2 MMHG
BH CV ECHO MEAS - LV MEAN PG: 1.7 MMHG
BH CV ECHO MEAS - LV SYSTOLIC VOL/BSA (12-30): 28 ML/M^2
BH CV ECHO MEAS - LV V1 MAX: 89.4 CM/SEC
BH CV ECHO MEAS - LV V1 MEAN: 60.6 CM/SEC
BH CV ECHO MEAS - LV V1 VTI: 21.9 CM
BH CV ECHO MEAS - LVIDD: 5.2 CM
BH CV ECHO MEAS - LVIDS: 3.8 CM
BH CV ECHO MEAS - LVLD AP2: 9.3 CM
BH CV ECHO MEAS - LVLD AP4: 9.3 CM
BH CV ECHO MEAS - LVLS AP2: 8.2 CM
BH CV ECHO MEAS - LVLS AP4: 7.9 CM
BH CV ECHO MEAS - LVOT AREA (M): 2.5 CM^2
BH CV ECHO MEAS - LVOT AREA: 2.6 CM^2
BH CV ECHO MEAS - LVOT DIAM: 1.8 CM
BH CV ECHO MEAS - LVPWD: 1.5 CM
BH CV ECHO MEAS - MED PEAK E' VEL: 5.59 CM/SEC
BH CV ECHO MEAS - MV A MAX VEL: 96.5 CM/SEC
BH CV ECHO MEAS - MV DEC SLOPE: 279 CM/SEC^2
BH CV ECHO MEAS - MV E MAX VEL: 166.7 CM/SEC
BH CV ECHO MEAS - MV E/A: 1.7
BH CV ECHO MEAS - MV MAX PG: 15.7 MMHG
BH CV ECHO MEAS - MV MEAN PG: 3.9 MMHG
BH CV ECHO MEAS - MV P1/2T MAX VEL: 101.3 CM/SEC
BH CV ECHO MEAS - MV P1/2T: 106.4 MSEC
BH CV ECHO MEAS - MV V2 MAX: 197.9 CM/SEC
BH CV ECHO MEAS - MV V2 MEAN: 87.3 CM/SEC
BH CV ECHO MEAS - MV V2 VTI: 57.6 CM
BH CV ECHO MEAS - MVA P1/2T LCG: 2.2 CM^2
BH CV ECHO MEAS - MVA(P1/2T): 2.1 CM^2
BH CV ECHO MEAS - MVA(VTI): 1 CM^2
BH CV ECHO MEAS - PA ACC SLOPE: 893.7 CM/SEC^2
BH CV ECHO MEAS - PA ACC TIME: 0.08 SEC
BH CV ECHO MEAS - PA PR(ACCEL): 41 MMHG
BH CV ECHO MEAS - RVDD: 2.7 CM
BH CV ECHO MEAS - SI(AO): 226.2 ML/M^2
BH CV ECHO MEAS - SI(CUBED): 43 ML/M^2
BH CV ECHO MEAS - SI(LVOT): 28.5 ML/M^2
BH CV ECHO MEAS - SI(MOD-SP2): 30 ML/M^2
BH CV ECHO MEAS - SI(MOD-SP4): 32.5 ML/M^2
BH CV ECHO MEAS - SI(TEICH): 34 ML/M^2
BH CV ECHO MEAS - SV(AO): 460.1 ML
BH CV ECHO MEAS - SV(CUBED): 87.4 ML
BH CV ECHO MEAS - SV(LVOT): 58 ML
BH CV ECHO MEAS - SV(MOD-SP2): 61 ML
BH CV ECHO MEAS - SV(MOD-SP4): 66 ML
BH CV ECHO MEAS - SV(TEICH): 69.1 ML
BH CV ECHO MEAS - TAPSE (>1.6): 1.5 CM2
BH CV ECHO MEASUREMENTS AVERAGE E/E' RATIO: 29.02
BH CV STRESS BP STAGE 1: NORMAL
BH CV STRESS BP STAGE 2: NORMAL
BH CV STRESS DURATION MIN STAGE 1: 3
BH CV STRESS DURATION MIN STAGE 2: 3
BH CV STRESS DURATION SEC STAGE 1: 0
BH CV STRESS DURATION SEC STAGE 2: 0
BH CV STRESS GRADE STAGE 1: 10
BH CV STRESS GRADE STAGE 2: 12
BH CV STRESS HR STAGE 1: 112
BH CV STRESS HR STAGE 2: 116
BH CV STRESS METS STAGE 1: 5
BH CV STRESS METS STAGE 2: 7.5
BH CV STRESS O2 STAGE 2: 95
BH CV STRESS PROTOCOL 1: NORMAL
BH CV STRESS RECOVERY BP: NORMAL MMHG
BH CV STRESS RECOVERY HR: 90 BPM
BH CV STRESS RECOVERY O2: 96 %
BH CV STRESS SPEED STAGE 1: 1.7
BH CV STRESS SPEED STAGE 2: 2.5
BH CV STRESS STAGE 1: 1
BH CV STRESS STAGE 2: 2
BH CV VAS BP LEFT ARM: NORMAL MMHG
BH CV XLRA - RV BASE: 4.9 CM
BH CV XLRA - RV LENGTH: 8.6 CM
BH CV XLRA - RV MID: 3.4 CM
BH CV XLRA - TDI S': 7.68 CM/SEC
LEFT ATRIUM VOLUME INDEX: 50.7 ML/M2
LV EF 2D ECHO EST: 50 %
LV EF NUC BP: 48 %
MAXIMAL PREDICTED HEART RATE: 148 BPM
MAXIMAL PREDICTED HEART RATE: 148 BPM
PERCENT MAX PREDICTED HR: 85.81 %
STRESS BASELINE BP: NORMAL MMHG
STRESS BASELINE HR: 64 BPM
STRESS PERCENT HR: 101 %
STRESS POST ESTIMATED WORKLOAD: 6.7 METS
STRESS POST EXERCISE DUR MIN: 6 MIN
STRESS POST EXERCISE DUR SEC: 0 SEC
STRESS POST O2 SAT PEAK: 95 %
STRESS POST PEAK BP: NORMAL MMHG
STRESS POST PEAK HR: 127 BPM
STRESS TARGET HR: 126 BPM
STRESS TARGET HR: 126 BPM

## 2018-05-22 PROCEDURE — 25010000002 SULFUR HEXAFLUORIDE MICROSPH 60.7-25 MG RECONSTITUTED SUSPENSION: Performed by: INTERNAL MEDICINE

## 2018-05-22 PROCEDURE — 93017 CV STRESS TEST TRACING ONLY: CPT

## 2018-05-22 PROCEDURE — 78452 HT MUSCLE IMAGE SPECT MULT: CPT

## 2018-05-22 PROCEDURE — A9500 TC99M SESTAMIBI: HCPCS | Performed by: INTERNAL MEDICINE

## 2018-05-22 PROCEDURE — 0 TECHNETIUM SESTAMIBI: Performed by: INTERNAL MEDICINE

## 2018-05-22 PROCEDURE — 78452 HT MUSCLE IMAGE SPECT MULT: CPT | Performed by: INTERNAL MEDICINE

## 2018-05-22 PROCEDURE — 93306 TTE W/DOPPLER COMPLETE: CPT | Performed by: INTERNAL MEDICINE

## 2018-05-22 PROCEDURE — 93018 CV STRESS TEST I&R ONLY: CPT | Performed by: INTERNAL MEDICINE

## 2018-05-22 PROCEDURE — 93306 TTE W/DOPPLER COMPLETE: CPT

## 2018-05-22 RX ORDER — TRAMADOL HYDROCHLORIDE 50 MG/1
50 TABLET ORAL NIGHTLY
COMMUNITY

## 2018-05-22 RX ADMIN — TECHNETIUM TC 99M SESTAMIBI 1 DOSE: 1 INJECTION INTRAVENOUS at 10:40

## 2018-05-22 RX ADMIN — SULFUR HEXAFLUORIDE 3 ML: KIT at 11:40

## 2018-05-22 RX ADMIN — TECHNETIUM TC 99M SESTAMIBI 1 DOSE: 1 INJECTION INTRAVENOUS at 12:50

## 2018-05-29 ENCOUNTER — OFFICE VISIT (OUTPATIENT)
Dept: CARDIOLOGY | Facility: HOSPITAL | Age: 73
End: 2018-05-29

## 2018-05-29 ENCOUNTER — HOSPITAL ENCOUNTER (OUTPATIENT)
Dept: CARDIOLOGY | Facility: HOSPITAL | Age: 73
Discharge: HOME | End: 2018-05-29
Admitting: NURSE PRACTITIONER

## 2018-05-29 ENCOUNTER — LAB (OUTPATIENT)
Dept: LAB | Facility: HOSPITAL | Age: 73
End: 2018-05-29

## 2018-05-29 VITALS
SYSTOLIC BLOOD PRESSURE: 158 MMHG | WEIGHT: 212 LBS | TEMPERATURE: 97.4 F | RESPIRATION RATE: 18 BRPM | HEART RATE: 69 BPM | OXYGEN SATURATION: 98 % | HEIGHT: 66 IN | DIASTOLIC BLOOD PRESSURE: 78 MMHG | BODY MASS INDEX: 34.07 KG/M2

## 2018-05-29 DIAGNOSIS — R53.83 FATIGUE, UNSPECIFIED TYPE: ICD-10-CM

## 2018-05-29 DIAGNOSIS — I49.3 PVC (PREMATURE VENTRICULAR CONTRACTION): ICD-10-CM

## 2018-05-29 DIAGNOSIS — I10 ESSENTIAL HYPERTENSION: ICD-10-CM

## 2018-05-29 DIAGNOSIS — I25.10 CORONARY ARTERY DISEASE INVOLVING NATIVE CORONARY ARTERY OF NATIVE HEART WITHOUT ANGINA PECTORIS: ICD-10-CM

## 2018-05-29 DIAGNOSIS — I49.3 PVC (PREMATURE VENTRICULAR CONTRACTION): Primary | ICD-10-CM

## 2018-05-29 LAB
MAGNESIUM SERPL-MCNC: 2.1 MG/DL (ref 1.3–2.7)
TSH SERPL DL<=0.05 MIU/L-ACNC: 1.01 MIU/ML (ref 0.35–5.35)

## 2018-05-29 PROCEDURE — 93005 ELECTROCARDIOGRAM TRACING: CPT | Performed by: NURSE PRACTITIONER

## 2018-05-29 PROCEDURE — 83735 ASSAY OF MAGNESIUM: CPT

## 2018-05-29 PROCEDURE — 36415 COLL VENOUS BLD VENIPUNCTURE: CPT

## 2018-05-29 PROCEDURE — 84481 FREE ASSAY (FT-3): CPT

## 2018-05-29 PROCEDURE — 93010 ELECTROCARDIOGRAM REPORT: CPT | Performed by: INTERNAL MEDICINE

## 2018-05-29 PROCEDURE — 99214 OFFICE O/P EST MOD 30 MIN: CPT | Performed by: NURSE PRACTITIONER

## 2018-05-29 PROCEDURE — 84443 ASSAY THYROID STIM HORMONE: CPT

## 2018-05-29 NOTE — PROGRESS NOTES
Encounter Date:05/29/2018      Patient ID: Merlin Pritchard is a 72 y.o. male.        Subjective:     Chief Complaint: Establish Care (Abnormal holter)     History of Present Illness patient presents to the office today for ongoing evaluation of his palpitations. He notes that since the beginning of April he has experienced palpitations daily and frequently throughout the day. He notes that he recently wore a holter monitor that showed a PVC burden of 20%. He notes that the palpitations have improved since his stress test but notes that they still occur daily but not as frequently throughout the day. He notes fatigue and dyspnea on exertion that improves with rest. He denies chest pain. He does mild intermittent pedal edema that worsens as the day goes on. He is currently taking Nattokinase and denies any s/s of bleeding.     Patient Active Problem List   Diagnosis   • Coronary artery disease involving native coronary artery of native heart with angina pectoris   • VHD (valvular heart disease)   • Atrial flutter   • AAA (abdominal aortic aneurysm)   • Essential hypertension   • Hyperlipidemia LDL goal <70   • IDDM (insulin dependent diabetes mellitus)   • COPD (chronic obstructive pulmonary disease)   • MICHEL (obstructive sleep apnea)   • Thrombocytopenia   • Tobacco abuse   • Spinal stenosis, lumbar region, with neurogenic claudication   • Lumbar stenosis with neurogenic claudication   • Premature ventricular contractions       Past Surgical History:   Procedure Laterality Date   • APPENDECTOMY     • BACK SURGERY      lumbar lami   • BASAL CELL CARCINOMA EXCISION       from face.   • COLONOSCOPY     • CORONARY ARTERY BYPASS GRAFT  03/22/2004    x4. redo 2-vessel CABG in 2012   • ENDOSCOPY     • INTERVENTIONAL RADIOLOGY PROCEDURE N/A 9/19/2017    Procedure: IR myelogram lumbar spine;  Surgeon: Jay Ellison MD;  Location: [facility] INVASIVE LOCATION;  Service:    • LUMBAR DISCECTOMY N/A 10/25/2017    Procedure:  lumbar foraminotomy L4 5;  Surgeon: Justin Buchanan MD;  Location: [facility];  Service:    • MITRAL VALVE ANNULOPLASTY  04/17/2012   • TONSILLECTOMY AND ADENOIDECTOMY      4-4 yo   • VASECTOMY     • WISDOM TOOTH EXTRACTION      1965- all 4       Allergies   Allergen Reactions   • Crestor [Rosuvastatin Calcium] Myalgia   • Influenza Vaccines Other (See Comments)     URI   • Lipitor [Atorvastatin] Myalgia   • Niacin And Related Myalgia   • Pravachol [Pravastatin Sodium] Myalgia   • Tricor [Fenofibrate] Myalgia   • Zocor [Simvastatin] Myalgia         Current Outpatient Prescriptions:   •  amLODIPine (NORVASC) 5 MG tablet, Take 5 mg by mouth 2 (Two) Times a Day., Disp: , Rfl:   •  carvedilol (COREG) 25 MG tablet, Take 25 mg by mouth 2 (Two) Times a Day., Disp: , Rfl:   •  Coenzyme Q10 300 MG capsule, Take 1 capsule by mouth Every Morning., Disp: , Rfl:   •  fluticasone (FLONASE) 50 MCG/ACT nasal spray, 2 sprays into each nostril Every Night., Disp: , Rfl:   •  HUMALOG 100 UNIT/ML injection, Inject 10-25 Units under the skin 3 (Three) Times a Day. 10-20 units before each meal.  Sliding scale, Disp: , Rfl:   •  hydrochlorothiazide (HYDRODIURIL) 25 MG tablet, Take 25 mg by mouth Every Morning., Disp: , Rfl:   •  ibuprofen (ADVIL,MOTRIN) 800 MG tablet, Take 1 tablet by mouth Every 6 (Six) Hours As Needed for Mild Pain . (Patient taking differently: Take 800 mg by mouth Every Night.), Disp: 90 tablet, Rfl: 3  •  LANTUS 100 UNIT/ML injection, Inject 35 Units under the skin Every Night. Took 15 units 10/24/17 pm, Disp: , Rfl:   •  lisinopril (PRINIVIL,ZESTRIL) 40 MG tablet, Take 40 mg by mouth 2 (Two) Times a Day., Disp: , Rfl:   •  MAGNESIUM CHLORIDE PO, Take 1 capsule by mouth 2 (Two) Times a Day., Disp: , Rfl:   •  NATTOKINASE PO, Take 1 capsule by mouth 2 (Two) Times a Day. Pt takes as supplement -  States similar to warfarin, Disp: , Rfl:   •  POTASSIUM CHLORIDE ER PO, Take 1 tablet by mouth Every Morning. OTC, Disp:  , Rfl:   •  traMADol (ULTRAM) 50 MG tablet, Take 50 mg by mouth Every Night., Disp: , Rfl:   •  TURMERIC PO, Take 1 capsule by mouth 2 (Two) Times a Day., Disp: , Rfl:   •  vitamin E 400 UNIT capsule, Take 400 Units by mouth Every Morning., Disp: , Rfl:     The following portions of the chart were reviewed and updated as appropriate: Allergies, current medications, past family history, social history, past medical history.     Review of Systems   Constitution: Positive for malaise/fatigue. Negative for chills, decreased appetite, diaphoresis, fever, weakness, night sweats, weight gain and weight loss.   HENT: Negative for congestion, hearing loss, hoarse voice and nosebleeds.    Eyes: Negative for blurred vision, visual disturbance and visual halos.   Cardiovascular: Positive for dyspnea on exertion, leg swelling (mild), near-syncope (resolved) and palpitations. Negative for chest pain, claudication, cyanosis, irregular heartbeat, orthopnea, paroxysmal nocturnal dyspnea and syncope.   Respiratory: Negative for cough, hemoptysis, shortness of breath, sleep disturbances due to breathing, snoring, sputum production and wheezing.    Hematologic/Lymphatic: Negative for bleeding problem. Does not bruise/bleed easily.   Skin: Negative for dry skin, itching and rash.   Musculoskeletal: Positive for joint pain, muscle cramps and muscle weakness. Negative for arthritis, falls, joint swelling and myalgias.   Gastrointestinal: Positive for heartburn and nausea. Negative for bloating, abdominal pain, constipation, diarrhea, flatus, hematemesis, hematochezia, melena and vomiting.   Genitourinary: Negative for dysuria, frequency, hematuria, nocturia and urgency.   Neurological: Positive for light-headedness (associated with pvcs). Negative for excessive daytime sleepiness, dizziness, headaches and loss of balance.   Psychiatric/Behavioral: Negative for depression. The patient does not have insomnia and is not nervous/anxious.   "          Objective:     Vitals:    05/29/18 0915 05/29/18 0917 05/29/18 0918   BP: 140/68 141/70 158/78   BP Location: Right arm Left arm Left arm   Patient Position: Sitting Sitting Standing   Cuff Size: Adult     Pulse: 68 71 69   Resp: 18     Temp: 97.4 °F (36.3 °C)     TempSrc: Temporal Artery      SpO2: 98%     Weight: 96.2 kg (212 lb)     Height: 167.6 cm (66\")           Physical Exam   Constitutional: He is oriented to person, place, and time. He appears well-developed and well-nourished. He is active and cooperative. No distress.   HENT:   Head: Normocephalic and atraumatic.   Mouth/Throat: Oropharynx is clear and moist.   Eyes: Conjunctivae and EOM are normal. Pupils are equal, round, and reactive to light.   Neck: Normal range of motion. Neck supple. No JVD present. No tracheal deviation present. No thyromegaly present.   Cardiovascular: Normal rate, regular rhythm, normal heart sounds and intact distal pulses.    Pulmonary/Chest: Effort normal and breath sounds normal.   Abdominal: Soft. Bowel sounds are normal. He exhibits no distension. There is no tenderness.   Musculoskeletal: Normal range of motion.   Neurological: He is alert and oriented to person, place, and time.   Skin: Skin is warm, dry and intact.   Psychiatric: He has a normal mood and affect. His behavior is normal.   Nursing note and vitals reviewed.      Lab and Diagnostic Review:    EKG: SB at 59 bpm   4/3/2018: WBC 5.2, RBC 4.73, hemoglobin 14.9, hematocrit 41.6, platelets 95, M1 41, potassium 4.1, chloride 103.7, carbon dioxide 26, BUN 29.4, creatinine 1.1, estimated GFR greater than 59, glucose 117, calcium 8.8, total bilirubin 0.4, AST 15, ALT 16, total protein 6.4, albumin 4.2, alkaline phosphatase 58, A1c 6.3  · Echo 5/22/18: Left ventricular systolic function is normal. Estimated EF = 50%.  · The following left ventricular wall segments are hypokinetic: apical septal, apex hypokinetic and mid anteroseptal.  · The left ventricular " cavity is borderline dilated.  · Left ventricular wall thickness is consistent with mild concentric hypertrophy.  · Mild to moderate aortic valve stenosis is present.          Mild mitral valve regurgitation is present    · 5/22/18: Stress test: Normal graded exercise stress test by clinical and EKG criteria.  · At baseline, frequent PVCs present which improved with exercise.  · Expected exercise time 6:50. Actual exercise 6:00.  · Myocardial perfusion imaging indicates a medium-sized infarct located in the anterior wall with no significant ischemia noted.  · An additional medium-sized infarct located in the basal inferior lateral wall with no significant ischemia was noted.  · Left ventricular ejection fraction is borderline normal (Calculated EF = 48%).   HOLTER April : PVC burden 20%  TSH, T3, Mag pending  Assessment and Plan:         1. PVC (premature ventricular contraction)  pvcs have improved  Will discuss with Dr Miramontes  - ECG 12 Lead; Future  - TSH; Future  - T3, Free; Future  - Magnesium; Future    2. Fatigue, unspecified type    - TSH; Future  - T3, Free; Future  - Magnesium; Future    3. Coronary artery disease involving native coronary artery of native heart without angina pectoris  Without angina  Continue BB  Taking nattokinase in place of ASA    4. Essential hypertension  Well controlled on lisinopril, coreg, norvasc    It has been a pleasure to participate in the care of this patient.  Patient was instructed to call the [facility] with any questions, concerns, or worsening symptoms.        * Please note that portions of this note were completed with a voice recognition program. Efforts were made to edit the dictation but occasionally words are transcribed.

## 2018-05-30 LAB — T3FREE SERPL-MCNC: 2.9 PG/ML (ref 2–4.4)

## 2018-06-01 ENCOUNTER — APPOINTMENT (OUTPATIENT)
Dept: CARDIOLOGY | Facility: HOSPITAL | Age: 73
End: 2018-06-01
Attending: INTERNAL MEDICINE

## 2018-06-05 ENCOUNTER — PREP FOR SURGERY (OUTPATIENT)
Dept: OTHER | Facility: HOSPITAL | Age: 73
End: 2018-06-05

## 2018-06-05 ENCOUNTER — TELEPHONE (OUTPATIENT)
Dept: CARDIOLOGY | Facility: CLINIC | Age: 73
End: 2018-06-05

## 2018-06-05 DIAGNOSIS — I49.3 PREMATURE VENTRICULAR CONTRACTIONS: ICD-10-CM

## 2018-06-05 DIAGNOSIS — IMO0001 IDDM (INSULIN DEPENDENT DIABETES MELLITUS): ICD-10-CM

## 2018-06-05 DIAGNOSIS — E78.5 HYPERLIPIDEMIA LDL GOAL <70: ICD-10-CM

## 2018-06-05 DIAGNOSIS — I25.119 CORONARY ARTERY DISEASE INVOLVING NATIVE CORONARY ARTERY OF NATIVE HEART WITH ANGINA PECTORIS (HCC): Primary | ICD-10-CM

## 2018-06-05 RX ORDER — ASPIRIN 325 MG
325 TABLET ORAL ONCE
Status: CANCELLED | OUTPATIENT
Start: 2018-06-05 | End: 2018-06-05

## 2018-06-05 RX ORDER — ALPRAZOLAM 0.5 MG/1
0.5 TABLET ORAL ONCE
Status: CANCELLED | OUTPATIENT
Start: 2018-06-05 | End: 2018-06-05

## 2018-06-05 RX ORDER — ASPIRIN 325 MG
325 TABLET, DELAYED RELEASE (ENTERIC COATED) ORAL DAILY
Status: CANCELLED | OUTPATIENT
Start: 2018-06-06

## 2018-06-05 RX ORDER — SODIUM CHLORIDE 9 MG/ML
3 INJECTION, SOLUTION INTRAVENOUS CONTINUOUS
Status: CANCELLED | OUTPATIENT
Start: 2018-06-05 | End: 2018-06-05

## 2018-06-05 NOTE — TELEPHONE ENCOUNTER
"I spoke with the patient on the phone. He is having worsening exertional shortness of breath and fatigue. He states the PVCs he is experiencing are also becoming more frequent and interrupting his sleep. He made a phone call to hospital administration frustrated that he is \"getting the run around\" office. I see that he is spoken with me on the phone on 5/11/18 and I responded to his email on 5/20/18. He then saw Lili Kelly and on 5/29/2018.    With the patient's worsening symptoms, abnormalities on stress testing, and worsening ventricular ectopy, I am recommending he undergo cardiac catheterization to include ischemic etiology for his symptoms.    Cardiac catheterization shows stable anatomy, we will refer to EP for evaluation of his frequent ventricular ectopy (PVC burden 20%).    Cordell Miramontes IV, MD  6/5/2018    "

## 2018-06-07 ENCOUNTER — TELEPHONE (OUTPATIENT)
Dept: CARDIOLOGY | Facility: CLINIC | Age: 73
End: 2018-06-07

## 2018-06-07 NOTE — TELEPHONE ENCOUNTER
I spoke with the patient on the phone. He has sent a my chart message to me with concerns about his schedule heart catheterization. His primary question to me was whether it is necessary. I explained my rationale for recommending heart catheterization as follows:    · The patient is having worsening exertional tolerance which certainly can be construed as an anginal equivalent  · The patient has older bypass grafts that would be susceptible to the generation  · Ventricular ectopy can be a manifestation of ischemic heart disease  · His nuclear stress test shows abnormalities which may be related to old myocardial infarction for which medical therapy would be recommended OR  high-grade stenosis for which revascularization would be indicated.    The patient states that I answered his questions sufficiently and will plan to proceed with heart catheterization on Monday, 6/11/18.    Cordell Miramontes IV, MD  6/7/2018

## 2018-06-10 ENCOUNTER — APPOINTMENT (OUTPATIENT)
Dept: PREADMISSION TESTING | Facility: HOSPITAL | Age: 73
End: 2018-06-10

## 2018-06-10 DIAGNOSIS — I25.119 CORONARY ARTERY DISEASE INVOLVING NATIVE CORONARY ARTERY OF NATIVE HEART WITH ANGINA PECTORIS (HCC): ICD-10-CM

## 2018-06-10 DIAGNOSIS — E78.5 HYPERLIPIDEMIA LDL GOAL <70: ICD-10-CM

## 2018-06-10 DIAGNOSIS — IMO0001 IDDM (INSULIN DEPENDENT DIABETES MELLITUS): ICD-10-CM

## 2018-06-10 LAB
ALBUMIN SERPL-MCNC: 4.16 G/DL (ref 3.2–4.8)
ALBUMIN/GLOB SERPL: 1.5 G/DL (ref 1.5–2.5)
ALP SERPL-CCNC: 73 U/L (ref 25–100)
ALT SERPL W P-5'-P-CCNC: 22 U/L (ref 7–40)
ANION GAP SERPL CALCULATED.3IONS-SCNC: 5 MMOL/L (ref 3–11)
AST SERPL-CCNC: 18 U/L (ref 0–33)
BASOPHILS # BLD AUTO: 0.03 10*3/MM3 (ref 0–0.2)
BASOPHILS NFR BLD AUTO: 0.5 % (ref 0–1)
BILIRUB SERPL-MCNC: 0.8 MG/DL (ref 0.3–1.2)
BUN BLD-MCNC: 21 MG/DL (ref 9–23)
BUN/CREAT SERPL: 19.4 (ref 7–25)
CALCIUM SPEC-SCNC: 9.5 MG/DL (ref 8.7–10.4)
CHLORIDE SERPL-SCNC: 106 MMOL/L (ref 99–109)
CO2 SERPL-SCNC: 30 MMOL/L (ref 20–31)
CREAT BLD-MCNC: 1.08 MG/DL (ref 0.6–1.3)
DEPRECATED RDW RBC AUTO: 43.3 FL (ref 37–54)
EOSINOPHIL # BLD AUTO: 0.29 10*3/MM3 (ref 0–0.3)
EOSINOPHIL NFR BLD AUTO: 5 % (ref 0–3)
ERYTHROCYTE [DISTWIDTH] IN BLOOD BY AUTOMATED COUNT: 13.1 % (ref 11.3–14.5)
GFR SERPL CREATININE-BSD FRML MDRD: 67 ML/MIN/1.73
GLOBULIN UR ELPH-MCNC: 2.7 GM/DL
GLUCOSE BLD-MCNC: 140 MG/DL (ref 70–100)
HBA1C MFR BLD: 6.4 % (ref 4.8–5.6)
HCT VFR BLD AUTO: 43.8 % (ref 38.9–50.9)
HGB BLD-MCNC: 15.4 G/DL (ref 13.1–17.5)
IMM GRANULOCYTES # BLD: 0.02 10*3/MM3 (ref 0–0.03)
IMM GRANULOCYTES NFR BLD: 0.3 % (ref 0–0.6)
LYMPHOCYTES # BLD AUTO: 1.77 10*3/MM3 (ref 0.6–4.8)
LYMPHOCYTES NFR BLD AUTO: 30.3 % (ref 24–44)
MCH RBC QN AUTO: 32 PG (ref 27–31)
MCHC RBC AUTO-ENTMCNC: 35.2 G/DL (ref 32–36)
MCV RBC AUTO: 91.1 FL (ref 80–99)
MONOCYTES # BLD AUTO: 0.69 10*3/MM3 (ref 0–1)
MONOCYTES NFR BLD AUTO: 11.8 % (ref 0–12)
NEUTROPHILS # BLD AUTO: 3.05 10*3/MM3 (ref 1.5–8.3)
NEUTROPHILS NFR BLD AUTO: 52.1 % (ref 41–71)
PLATELET # BLD AUTO: 98 10*3/MM3 (ref 150–450)
PMV BLD AUTO: 11.3 FL (ref 6–12)
POTASSIUM BLD-SCNC: 5 MMOL/L (ref 3.5–5.5)
PROT SERPL-MCNC: 6.9 G/DL (ref 5.7–8.2)
RBC # BLD AUTO: 4.81 10*6/MM3 (ref 4.2–5.76)
SODIUM BLD-SCNC: 141 MMOL/L (ref 132–146)
WBC NRBC COR # BLD: 5.85 10*3/MM3 (ref 3.5–10.8)

## 2018-06-10 PROCEDURE — 85025 COMPLETE CBC W/AUTO DIFF WBC: CPT | Performed by: INTERNAL MEDICINE

## 2018-06-10 PROCEDURE — 80053 COMPREHEN METABOLIC PANEL: CPT | Performed by: INTERNAL MEDICINE

## 2018-06-10 PROCEDURE — 83036 HEMOGLOBIN GLYCOSYLATED A1C: CPT | Performed by: INTERNAL MEDICINE

## 2018-06-10 PROCEDURE — 36415 COLL VENOUS BLD VENIPUNCTURE: CPT

## 2018-06-10 RX ORDER — NITROGLYCERIN 0.4 MG/1
0.4 TABLET SUBLINGUAL
COMMUNITY
End: 2019-02-05 | Stop reason: SDUPTHER

## 2018-06-10 NOTE — DISCHARGE INSTRUCTIONS
"Dear Patient,    Drink plenty of fluids the day before to ensure you are well hydrated, unless otherwise directed by your physician.    Do NOT eat after midnight the night before your procedure.   You may have clear liquids only up to three hours before your scheduled arrival time (Water is best, but clear liquids can also include coffee without cream or milk, fruit juice without pulp, clear broth, and clear gelatin)    We encourage you to drink 8 ounces of water three to four hours before your scheduled arrival time.    Take your medications as instructed by your doctor.    Following your procedure, be sure to drink plenty of fluids to continue flushing the kidneys.   Benefits of hydrating before and after your procedure include:    -improved hydration helps prevent potential harm to the kidneys    by flushing the contrast/dye used during your procedure    -Lower post-procedure complications    -Improved patient comfort     Do NOT smoke after midnight the night before your procedure.    Glasses and jewelry may be worn, but dentures must be removed prior to your procedure.    Leave any items you consider valuable at home.      MORNING of your Procedure, please bring the following:     -Photo ID and insurance card(s)    -ALL medications in their ORIGINAL CONTAINERS    -Co-pay and/or deductible required by your insurance   -Copy of living will or power of  document (if not brought to    Pre-Admission Testing department)   -CPAP mask and tubing, not your machine (if applicable)    -Relaxation aids (music, books, magazines)    Family members may wait in [facility] waiting area during procedure.    Need to make arrangements for transportation prior to discharge.    A handout regarding \"Heart Healthy Eating\" was provided today to encourage healthy eating habits.    Booklet published by [employer] was given in Pre-Admission testing.  This booklet is for informational purposes only.  If you have any questions about your " procedure, please speak with your physician.

## 2018-06-11 ENCOUNTER — HOSPITAL ENCOUNTER (OUTPATIENT)
Facility: HOSPITAL | Age: 73
Setting detail: HOSPITAL OUTPATIENT SURGERY
Discharge: HOME | End: 2018-06-11
Attending: INTERNAL MEDICINE | Admitting: INTERNAL MEDICINE

## 2018-06-11 VITALS
BODY MASS INDEX: 33.59 KG/M2 | TEMPERATURE: 97 F | WEIGHT: 209 LBS | RESPIRATION RATE: 16 BRPM | OXYGEN SATURATION: 96 % | HEIGHT: 66 IN | DIASTOLIC BLOOD PRESSURE: 67 MMHG | SYSTOLIC BLOOD PRESSURE: 134 MMHG | HEART RATE: 57 BPM

## 2018-06-11 DIAGNOSIS — I25.119 CORONARY ARTERY DISEASE INVOLVING NATIVE CORONARY ARTERY OF NATIVE HEART WITH ANGINA PECTORIS (HCC): ICD-10-CM

## 2018-06-11 DIAGNOSIS — I49.3 PREMATURE VENTRICULAR CONTRACTIONS: ICD-10-CM

## 2018-06-11 DIAGNOSIS — E78.5 HYPERLIPIDEMIA LDL GOAL <70: ICD-10-CM

## 2018-06-11 PROBLEM — E66.9 CLASS 1 OBESITY IN ADULT: Status: ACTIVE | Noted: 2018-06-11

## 2018-06-11 PROBLEM — M48.062 SPINAL STENOSIS, LUMBAR REGION, WITH NEUROGENIC CLAUDICATION: Status: RESOLVED | Noted: 2017-09-08 | Resolved: 2018-06-11

## 2018-06-11 PROBLEM — Z72.0 TOBACCO ABUSE: Status: RESOLVED | Noted: 2017-02-28 | Resolved: 2018-06-11

## 2018-06-11 LAB
ACT BLD: 274 SECONDS (ref 82–152)
ARTICHOKE IGE QN: 146 MG/DL (ref 0–130)
CHOLEST SERPL-MCNC: 204 MG/DL (ref 0–200)
GLUCOSE BLDC GLUCOMTR-MCNC: 144 MG/DL (ref 70–130)
GLUCOSE BLDC GLUCOMTR-MCNC: 145 MG/DL (ref 70–130)
GLUCOSE BLDC GLUCOMTR-MCNC: 164 MG/DL (ref 70–130)
HDLC SERPL-MCNC: 44 MG/DL (ref 40–60)
TRIGL SERPL-MCNC: 204 MG/DL (ref 0–150)

## 2018-06-11 PROCEDURE — 0 IOPAMIDOL PER 1 ML: Performed by: INTERNAL MEDICINE

## 2018-06-11 PROCEDURE — 85347 COAGULATION TIME ACTIVATED: CPT

## 2018-06-11 PROCEDURE — 25010000002 MIDAZOLAM PER 1 MG: Performed by: INTERNAL MEDICINE

## 2018-06-11 PROCEDURE — C1769 GUIDE WIRE: HCPCS | Performed by: INTERNAL MEDICINE

## 2018-06-11 PROCEDURE — C1725 CATH, TRANSLUMIN NON-LASER: HCPCS | Performed by: INTERNAL MEDICINE

## 2018-06-11 PROCEDURE — 36415 COLL VENOUS BLD VENIPUNCTURE: CPT

## 2018-06-11 PROCEDURE — S0260 H&P FOR SURGERY: HCPCS | Performed by: INTERNAL MEDICINE

## 2018-06-11 PROCEDURE — C1894 INTRO/SHEATH, NON-LASER: HCPCS | Performed by: INTERNAL MEDICINE

## 2018-06-11 PROCEDURE — 93455 CORONARY ART/GRFT ANGIO S&I: CPT | Performed by: INTERNAL MEDICINE

## 2018-06-11 PROCEDURE — 25010000002 HEPARIN (PORCINE) PER 1000 UNITS: Performed by: INTERNAL MEDICINE

## 2018-06-11 PROCEDURE — G0378 HOSPITAL OBSERVATION PER HR: HCPCS

## 2018-06-11 PROCEDURE — 82962 GLUCOSE BLOOD TEST: CPT

## 2018-06-11 PROCEDURE — C1884 EMBOLIZATION PROTECT SYST: HCPCS | Performed by: INTERNAL MEDICINE

## 2018-06-11 PROCEDURE — 80061 LIPID PANEL: CPT | Performed by: INTERNAL MEDICINE

## 2018-06-11 PROCEDURE — C1887 CATHETER, GUIDING: HCPCS | Performed by: INTERNAL MEDICINE

## 2018-06-11 PROCEDURE — 92937 PRQ TRLUML REVSC CAB GRF 1: CPT | Performed by: INTERNAL MEDICINE

## 2018-06-11 PROCEDURE — 25010000002 FENTANYL CITRATE (PF) 100 MCG/2ML SOLUTION: Performed by: INTERNAL MEDICINE

## 2018-06-11 RX ORDER — PRASUGREL 5 MG/1
TABLET, FILM COATED ORAL AS NEEDED
Status: DISCONTINUED | OUTPATIENT
Start: 2018-06-11 | End: 2018-06-11 | Stop reason: HOSPADM

## 2018-06-11 RX ORDER — ASPIRIN 81 MG/1
81 TABLET ORAL DAILY
Qty: 30 TABLET | Refills: 11 | Status: SHIPPED | OUTPATIENT
Start: 2018-06-11 | End: 2019-02-05 | Stop reason: SDUPTHER

## 2018-06-11 RX ORDER — SODIUM CHLORIDE 9 MG/ML
3 INJECTION, SOLUTION INTRAVENOUS CONTINUOUS
Status: ACTIVE | OUTPATIENT
Start: 2018-06-11 | End: 2018-06-11

## 2018-06-11 RX ORDER — ASPIRIN 325 MG
325 TABLET ORAL DAILY
Status: ON HOLD | COMMUNITY
End: 2018-06-11

## 2018-06-11 RX ORDER — LIDOCAINE HYDROCHLORIDE 10 MG/ML
INJECTION, SOLUTION EPIDURAL; INFILTRATION; INTRACAUDAL; PERINEURAL AS NEEDED
Status: DISCONTINUED | OUTPATIENT
Start: 2018-06-11 | End: 2018-06-11 | Stop reason: HOSPADM

## 2018-06-11 RX ORDER — ASPIRIN 325 MG
325 TABLET ORAL ONCE
Status: COMPLETED | OUTPATIENT
Start: 2018-06-11 | End: 2018-06-11

## 2018-06-11 RX ORDER — CLOPIDOGREL BISULFATE 75 MG/1
75 TABLET ORAL DAILY
Qty: 30 TABLET | Refills: 5 | Status: SHIPPED | OUTPATIENT
Start: 2018-06-11 | End: 2018-07-17

## 2018-06-11 RX ORDER — HEPARIN SODIUM 1000 [USP'U]/ML
INJECTION, SOLUTION INTRAVENOUS; SUBCUTANEOUS AS NEEDED
Status: DISCONTINUED | OUTPATIENT
Start: 2018-06-11 | End: 2018-06-11 | Stop reason: HOSPADM

## 2018-06-11 RX ORDER — MIDAZOLAM HYDROCHLORIDE 1 MG/ML
INJECTION INTRAMUSCULAR; INTRAVENOUS AS NEEDED
Status: DISCONTINUED | OUTPATIENT
Start: 2018-06-11 | End: 2018-06-11 | Stop reason: HOSPADM

## 2018-06-11 RX ORDER — FENTANYL CITRATE 50 UG/ML
INJECTION, SOLUTION INTRAMUSCULAR; INTRAVENOUS AS NEEDED
Status: DISCONTINUED | OUTPATIENT
Start: 2018-06-11 | End: 2018-06-11 | Stop reason: HOSPADM

## 2018-06-11 RX ORDER — ROSUVASTATIN CALCIUM 10 MG/1
10 TABLET, COATED ORAL DAILY
Qty: 30 TABLET | Refills: 11 | Status: SHIPPED | OUTPATIENT
Start: 2018-06-11 | End: 2018-07-17

## 2018-06-11 RX ORDER — ASPIRIN 325 MG
325 TABLET, DELAYED RELEASE (ENTERIC COATED) ORAL DAILY
Status: DISCONTINUED | OUTPATIENT
Start: 2018-06-12 | End: 2018-06-11 | Stop reason: HOSPADM

## 2018-06-11 RX ORDER — ALPRAZOLAM 0.5 MG/1
0.5 TABLET ORAL ONCE
Status: COMPLETED | OUTPATIENT
Start: 2018-06-11 | End: 2018-06-11

## 2018-06-11 RX ADMIN — SODIUM CHLORIDE 3 ML/KG/HR: 9 INJECTION, SOLUTION INTRAVENOUS at 06:57

## 2018-06-11 RX ADMIN — ASPIRIN 325 MG ORAL TABLET 325 MG: 325 PILL ORAL at 07:00

## 2018-06-11 RX ADMIN — ALPRAZOLAM 0.5 MG: 0.5 TABLET ORAL at 07:38

## 2018-06-11 NOTE — H&P
[facility] at [facility]  Cardiology H & P Note      Chief Complaint/Reason for service:    · Abnormal nuclear stress  · Functional class III angina/CHF  · Frequent PVCs         72-year-old gentleman with a history of coronary artery disease status post bypass in 2004 and redo sternotomy for mitral valve repair and single-vessel bypass in 2012 who over the last 6 months has been experiencing worsening exercise intolerance with shortness of breath. Recent evaluation revealed frequent PVCs with a 20% PVC burden. A nuclear stress test was performed which revealed fixed anterior apical and inferior lateral defects with a calculated EF of 49%. The patient is a CT can hardly do any physical activity without becoming worn out.    Past medical, surgical, social and family history reviewed in the patient's electronic medical record.    No current facility-administered medications on file prior to encounter.      Current Outpatient Prescriptions on File Prior to Encounter   Medication Sig Dispense Refill   • amLODIPine (NORVASC) 5 MG tablet Take 5 mg by mouth 2 (Two) Times a Day.     • carvedilol (COREG) 25 MG tablet Take 25 mg by mouth 2 (Two) Times a Day.     • Coenzyme Q10 300 MG capsule Take 1 capsule by mouth Every Morning.     • fluticasone (FLONASE) 50 MCG/ACT nasal spray 2 sprays into each nostril Every Night.     • HUMALOG 100 UNIT/ML injection Inject 10-25 Units under the skin 3 (Three) Times a Day. 10-20 units before each meal.  Sliding scale     • hydrochlorothiazide (HYDRODIURIL) 25 MG tablet Take 25 mg by mouth Every Morning.     • ibuprofen (ADVIL,MOTRIN) 800 MG tablet Take 1 tablet by mouth Every 6 (Six) Hours As Needed for Mild Pain . (Patient taking differently: Take 800 mg by mouth Every Night.) 90 tablet 3   • LANTUS 100 UNIT/ML injection Inject 35 Units under the skin Every Night.     • lisinopril (PRINIVIL,ZESTRIL) 40 MG tablet Take 40 mg by mouth 2 (Two) Times a Day.     •  MAGNESIUM CHLORIDE PO Take 1 capsule by mouth 2 (Two) Times a Day.     • POTASSIUM CHLORIDE ER PO Take 1 tablet by mouth Every Morning. OTC     • traMADol (ULTRAM) 50 MG tablet Take 50 mg by mouth Every Night.     • TURMERIC PO Take 1 capsule by mouth 2 (Two) Times a Day.     • vitamin E 400 UNIT capsule Take 400 Units by mouth Every Morning.     • NATTOKINASE PO Take 1 capsule by mouth 2 (Two) Times a Day. LD about a week ago or so         Review of Systems:   All systems were reviewed and negative except for:  Respiratory: positive for  See HPI  Cardiovascular: positive for  See HPI       Vital Sign Min/Max for last 24 hours  Temp  Min: 97 °F (36.1 °C)  Max: 97 °F (36.1 °C)   BP  Min: 131/100  Max: 131/100   Pulse  Min: 62  Max: 62   Resp  Min: 18  Max: 18   SpO2  Min: 97 %  Max: 97 %   No Data Recorded    No intake or output data in the 24 hours ending 06/11/18 0758        Physical Exam   Constitutional: He is oriented to person, place, and time. He appears well-developed and well-nourished.   Cardiovascular: Normal rate and regular rhythm.   Extrasystoles are present.   Pulmonary/Chest: Effort normal.   Abdominal: Soft.   Neurological: He is alert and oriented to person, place, and time.   Skin: Skin is warm.   Psychiatric: He has a normal mood and affect. His behavior is normal.       Results Review:   I reviewed the patient's recent labs in the electronic medical record.        Results from last 7 days  Lab Units 06/10/18  1035   SODIUM mmol/L 141   POTASSIUM mmol/L 5.0   CHLORIDE mmol/L 106   BUN mg/dL 21   CREATININE mg/dL 1.08           Results from last 7 days  Lab Units 06/10/18  1035   WBC 10*3/mm3 5.85   HEMOGLOBIN g/dL 15.4   HEMATOCRIT % 43.8   PLATELETS 10*3/mm3 98*       Lab Results   Component Value Date    HGBA1C 6.40 (H) 06/10/2018       Lab Results   Component Value Date    CHOL 204 (H) 06/11/2018    TRIG 204 (H) 06/11/2018    HDL 44 06/11/2018     (H) 06/11/2018    AST 18 06/10/2018     ALT 22 06/10/2018       EKG:           Hospital Problem List     Coronary artery disease involving native coronary artery of native heart with angina pectoris    Overview Addendum 6/7/2018  4:32 PM by Cordell Miramontes IV, MD     · Myocardial infarction, 1987, data deficit.  · CABG by Loi Kiser (3/22/2004):  LIMA graft to LAD. SVG to PDA. Sequential SVG to diagonal and obtuse marginal.   · Redo CABG by Gerardo Aguiar (4/17/2012): SVG to left circumflex. SVG to PDA. Mitral annuloplasty.  · Pharmacologic nuclear stress (5/22/18): Mostly fixed anterior apical and inferior lateral defects.  LVEF 49%.         Premature ventricular contractions    Overview Addendum 5/11/2018  1:10 PM by Cordell Miramontes IV, MD     · 24 Hour holter (5/9/2018): Sinus rhythm. Frequent PVCs accounting for 20% of total beats. Nonsustained VT of approximately 9 beats. No significant pauses.         Essential hypertension    Overview Addendum 5/9/2017 12:59 PM by Cordell Miramontes IV, MD     · Negative renal artery duplex, 06/28/2013         Hyperlipidemia LDL goal <70    Overview Addendum 5/9/2017 12:03 PM by HARDY López     · Intolerance to multiple statins (Lipitor, Zocor, Crestor, Tricor, Niacin)         IDDM (insulin dependent diabetes mellitus)    Overview Deleted 5/11/2018  1:06 PM by Cordell Miramontes IV, MD                72-year-old gentleman with a history of coronary artery disease with functional class III angina/heart failure symptoms. It's difficult to discern whether his present exercise intolerance is related to ischemic heart disease or frequent PVCs. Nuclear stress testing suggests to previous infarcts. Prior to undergoing EP evaluation for frequent PVCs, I have recommended the patient undergo cardiac catheterization to ensure no obstructive coronary artery disease amenable for PCI which could improve his symptoms/PVC burden.         · Cardiac catheterization via the left radial  approach    Cordell Miramontes IV, MD  6/11/2018

## 2018-06-11 NOTE — PLAN OF CARE
Problem: Patient Care Overview  Goal: Plan of Care Review  Outcome: Outcome(s) achieved Date Met: 06/11/18 06/11/18 1334   Coping/Psychosocial   Plan of Care Reviewed With patient   Plan of Care Review   Progress no change   OTHER   Outcome Summary Patient's site stable at time of discharge. The patient is being discharged home with family.      Goal: Individualization and Mutuality  Outcome: Outcome(s) achieved Date Met: 06/11/18    Goal: Discharge Needs Assessment  Outcome: Outcome(s) achieved Date Met: 06/11/18    Goal: Interprofessional Rounds/Family Conf  Outcome: Outcome(s) achieved Date Met: 06/11/18

## 2018-06-21 NOTE — DISCHARGE SUMMARY
Date of Discharge:  6/21/2018    Hospital Problem List     Coronary artery disease involving native coronary artery of native heart with angina pectoris    Overview Addendum 6/11/2018  9:12 AM by Cordell Miramontes IV, MD     · Myocardial infarction, 1987, data deficit.  · CABG by Loi Kiser (3/22/2004):  LIMA graft to LAD. SVG to PDA. Sequential SVG to diagonal and obtuse marginal.   · Redo CABG by Gerardo Aguiar (4/17/2012): SVG to left circumflex. SVG to PDA. Mitral annuloplasty.  · Pharmacologic nuclear stress (5/22/18): Mostly fixed anterior apical and inferior lateral defects.  LVEF 49%.  · Cardiac catheterization (6/11/2018): Severe native 3 vessel CAD (diagonal branch of LAD, left circumflex, RCA). 3 grafts patent (LIMA to LAD, SVG to OM, SVG to RPDA). Successful balloon angioplasty without stenting of the SVG to OM).         Premature ventricular contractions    Overview Addendum 5/11/2018  1:10 PM by Cordell Miramontes IV, MD     · 24 Hour holter (5/9/2018): Sinus rhythm. Frequent PVCs accounting for 20% of total beats. Nonsustained VT of approximately 9 beats. No significant pauses.         Essential hypertension    Overview Addendum 5/9/2017 12:59 PM by Cordell Miramontes IV, MD     · Negative renal artery duplex, 06/28/2013         Hyperlipidemia LDL goal <70    Overview Addendum 5/9/2017 12:03 PM by HARDY López     · Intolerance to multiple statins (Lipitor, Zocor, Crestor, Tricor, Niacin)         IDDM (insulin dependent diabetes mellitus)    Overview Deleted 5/11/2018  1:06 PM by Cordell Miramontes IV, MD            Class 1 obesity in adult            Hospital Course  Patient is a 72 y.o. male who is admitted for cardiac catheterization due to frequent PVCs and abnormal nuclear stress test. The patient was found to have a high-grade stenosis in the vein graft to the OM which responded quite nicely to angioplasty alone. The patient was discharged home the same day  in stable condition. Notably, the patient's PVCs, which were frequent months prior to his procedure, were relatively quiescent on the day of his procedure. Therefore no additional antiarrhythmic therapy was recommended.    Procedures Performed  Procedure(s):  Left Heart Cath  Angioplasty-coronary         Pertinent Test Results:    Lab Results   Component Value Date    GLUCOSE 140 (H) 06/10/2018    BUN 21 06/10/2018    CREATININE 1.08 06/10/2018    EGFRIFNONA 67 06/10/2018    BCR 19.4 06/10/2018    K 5.0 06/10/2018    CO2 30.0 06/10/2018    CALCIUM 9.5 06/10/2018    ALBUMIN 4.16 06/10/2018    LABIL2 1.5 06/10/2018    AST 18 06/10/2018    ALT 22 06/10/2018     Lab Results   Component Value Date    CHOL 204 (H) 06/11/2018    TRIG 204 (H) 06/11/2018    HDL 44 06/11/2018     (H) 06/11/2018    AST 18 06/10/2018    ALT 22 06/10/2018     Lab Results   Component Value Date    HGBA1C 6.40 (H) 06/10/2018         Ejection Fraction  Lab Results   Component Value Date    EF 48 05/22/2018       Echo EF Estimated  Lab Results   Component Value Date    ECHOEFEST 50 05/22/2018       Condition on Discharge:  stable    Physical Exam at Discharge    Vital Signs       Physical Exam   Constitutional: He is oriented to person, place, and time. He appears well-developed and well-nourished.   HENT:   Head: Normocephalic and atraumatic.   Cardiovascular: Normal rate and regular rhythm.    No murmur heard.  Pulmonary/Chest: Effort normal.   Abdominal: Soft.   Neurological: He is alert and oriented to person, place, and time.   Skin: Skin is warm.   Psychiatric: He has a normal mood and affect.       Discharge Disposition  Home or Self Care    Discharge Medications     Discharge Medications      New Medications      Instructions Start Date   ASPIR-LOW 81 MG EC tablet  Generic drug:  aspirin  Replaces:  aspirin 325 MG tablet   81 mg, Oral, Daily      clopidogrel 75 MG tablet  Commonly known as:  PLAVIX   75 mg, Oral, Daily       rosuvastatin 10 MG tablet  Commonly known as:  CRESTOR   10 mg, Oral, Daily         Changes to Medications      Instructions Start Date   ibuprofen 800 MG tablet  Commonly known as:  RADHA LADD  What changed:  when to take this   800 mg, Oral, Every 6 Hours PRN         Continue These Medications      Instructions Start Date   amLODIPine 5 MG tablet  Commonly known as:  NORVASC   5 mg, Oral, 2 Times Daily      carvedilol 25 MG tablet  Commonly known as:  COREG   25 mg, Oral, 2 Times Daily      Coenzyme Q10 300 MG capsule   1 capsule, Oral, Every Morning      fluticasone 50 MCG/ACT nasal spray  Commonly known as:  FLONASE   2 sprays, Nasal, Nightly      HUMALOG 100 UNIT/ML injection  Generic drug:  insulin lispro   10-25 Units, Subcutaneous, 3 Times Daily, 10-20 units before each meal.  Sliding scale      hydrochlorothiazide 25 MG tablet  Commonly known as:  HYDRODIURIL   25 mg, Oral, Every Morning      LANTUS 100 UNIT/ML injection  Generic drug:  insulin glargine   35 Units, Subcutaneous, Nightly      lisinopril 40 MG tablet  Commonly known as:  PRINIVIL,ZESTRIL   40 mg, Oral, 2 Times Daily      MAGNESIUM CHLORIDE PO   1 capsule, Oral, 2 Times Daily      nitroglycerin 0.4 MG SL tablet  Commonly known as:  NITROSTAT   0.4 mg, Sublingual, Every 5 Minutes PRN, Take no more than 3 doses in 15 minutes.       POTASSIUM CHLORIDE ER PO   1 tablet, Oral, Every Morning, OTC      traMADol 50 MG tablet  Commonly known as:  ULTRAM   50 mg, Oral, Nightly      TURMERIC PO   1 capsule, Oral, 2 Times Daily      vitamin E 400 UNIT capsule   400 Units, Oral, Every Morning         Stop These Medications    aspirin 325 MG tablet  Replaced by:  ASPIR-LOW 81 MG EC tablet     NATTOKINASE PO            Discharge Diet: ADA    Activity at Discharge: ad jered    Follow-up Appointments  Future Appointments  Date Time Provider Department Center   7/17/2018 10:15 AM HARDY López [facility] None         Test Results Pending at  Discharge       Cordell Miramontes IV, MD  06/21/18  8:37 AM    Time: Discharge 25 min

## 2018-07-17 ENCOUNTER — OFFICE VISIT (OUTPATIENT)
Dept: CARDIOLOGY | Facility: CLINIC | Age: 73
End: 2018-07-17

## 2018-07-17 VITALS
WEIGHT: 211 LBS | HEIGHT: 67 IN | HEART RATE: 59 BPM | SYSTOLIC BLOOD PRESSURE: 136 MMHG | BODY MASS INDEX: 33.12 KG/M2 | DIASTOLIC BLOOD PRESSURE: 78 MMHG

## 2018-07-17 DIAGNOSIS — I25.119 CORONARY ARTERY DISEASE INVOLVING NATIVE CORONARY ARTERY OF NATIVE HEART WITH ANGINA PECTORIS (HCC): Primary | ICD-10-CM

## 2018-07-17 DIAGNOSIS — I71.40 ABDOMINAL AORTIC ANEURYSM (AAA) WITHOUT RUPTURE (HCC): ICD-10-CM

## 2018-07-17 DIAGNOSIS — I49.3 PREMATURE VENTRICULAR CONTRACTIONS: ICD-10-CM

## 2018-07-17 DIAGNOSIS — I10 ESSENTIAL HYPERTENSION: ICD-10-CM

## 2018-07-17 DIAGNOSIS — I38 VHD (VALVULAR HEART DISEASE): ICD-10-CM

## 2018-07-17 DIAGNOSIS — E78.5 HYPERLIPIDEMIA LDL GOAL <70: ICD-10-CM

## 2018-07-17 PROCEDURE — 93000 ELECTROCARDIOGRAM COMPLETE: CPT | Performed by: NURSE PRACTITIONER

## 2018-07-17 PROCEDURE — 99214 OFFICE O/P EST MOD 30 MIN: CPT | Performed by: NURSE PRACTITIONER

## 2018-07-17 NOTE — PROGRESS NOTES
Encounter Date:07/17/2018    Patient ID: Merlin Pritchard is a 72 y.o. male who resides in [place], [place].  He was formerly a patient of Dr. Ruy Pearson    CC/Reason for visit:  Coronary Artery Disease            Merlin Pritchard returns today for follow-up for his premature ventricular contractions, coronary artery disease, valvular heart disease, and cardiac risk factors.  The patient contacted our office in May of this year reporting increased palpitations.  He reported a history of premature ventricular contractions but had noted a worsening.  His primary care provider ordered a 24-hour Holter that showed a 20% burden.  A stress test and myocardial perfusion study was ordered.  His stress test showed oat infarcts consistent with his known coronary anatomy and past events but no ischemia noted.  His echocardiogram showed a mitral valve ring present and mild mitral regurgitation with mild to moderate aortic stenosis normal LVEF.  The the patient was notified of these findings by Dr. Girma Miramontes who contacted him on several occasions as well as sent messages through my chart.  A cardiac catheterization was ordered and balloon angioplasty performed on an SVG to his obtuse marginal.  He was discharged home on low-dose Crestor as well as dual antiplatelet therapy.  The patient tells me he stopped taking Crestor and wishes to try no other statins as he always experiences myalgias.  He also stopped taking his Plavix because it was causing him significant muscle pain in his own orthopedic injuries.  He does admit his palpitations have greatly improved since intervention. The patient reports dissatisfaction with his cardiology care since Dr. Pearson's departure.  He actually contacted the administration department at our office prior to his cardiac catheterization as he did not feel like things were moving a long as they should be.  He does wish to follow up with Dr. Miramontes to discuss at his next follow-up    Review  of Systems   All other systems reviewed and are negative.      The patient's past medical, social, family history and ROS reviewed in the patient's electronic medical record.    Allergies  Crestor [rosuvastatin calcium]; Influenza vaccines; Lipitor [atorvastatin]; Naproxen; Niacin and related; Plavix [clopidogrel]; Pravachol [pravastatin sodium]; Tricor [fenofibrate]; and Zocor [simvastatin]    Outpatient Prescriptions Marked as Taking for the 7/17/18 encounter (Office Visit) with HARDY López   Medication Sig Dispense Refill   • amLODIPine (NORVASC) 5 MG tablet Take 5 mg by mouth 2 (Two) Times a Day.     • aspirin 81 MG EC tablet Take 1 tablet by mouth Daily 30 tablet 11   • carvedilol (COREG) 25 MG tablet Take 25 mg by mouth 2 (Two) Times a Day.     • Coenzyme Q10 300 MG capsule Take 1 capsule by mouth Every Morning.     • fluticasone (FLONASE) 50 MCG/ACT nasal spray 2 sprays into each nostril Every Night.     • HUMALOG 100 UNIT/ML injection Inject 10-25 Units under the skin 3 (Three) Times a Day. 10-20 units before each meal.  Sliding scale     • hydrochlorothiazide (HYDRODIURIL) 25 MG tablet Take 25 mg by mouth Every Morning.     • ibuprofen (ADVIL,MOTRIN) 800 MG tablet Take 1 tablet by mouth Every 6 (Six) Hours As Needed for Mild Pain . (Patient taking differently: Take 800 mg by mouth Every Night.) 90 tablet 3   • LANTUS 100 UNIT/ML injection Inject 35 Units under the skin Every Night.     • lisinopril (PRINIVIL,ZESTRIL) 40 MG tablet Take 40 mg by mouth 2 (Two) Times a Day.     • MAGNESIUM CHLORIDE PO Take 1 capsule by mouth 2 (Two) Times a Day.     • nitroglycerin (NITROSTAT) 0.4 MG SL tablet Place 0.4 mg under the tongue Every 5 (Five) Minutes As Needed for Chest Pain. Take no more than 3 doses in 15 minutes.     • POTASSIUM CHLORIDE ER PO Take 1 tablet by mouth Every Morning. OTC     • traMADol (ULTRAM) 50 MG tablet Take 50 mg by mouth Every Night.     • TURMERIC PO Take 1 capsule by mouth 2  "(Two) Times a Day.     • vitamin E 400 UNIT capsule Take 400 Units by mouth Every Morning.           Blood pressure 136/78, pulse 59, height 168.9 cm (66.5\"), weight 95.7 kg (211 lb).  Body mass index is 33.55 kg/m².  There were no vitals filed for this visit.    Physical Exam   Constitutional: He is oriented to person, place, and time. He appears well-developed and well-nourished.   HENT:   Head: Normocephalic and atraumatic.   Eyes: Pupils are equal, round, and reactive to light. No scleral icterus.   Neck: No JVD present. Carotid bruit is not present. No thyromegaly present.   Cardiovascular: Normal rate, regular rhythm, S1 normal and S2 normal.  Exam reveals no gallop.    Murmur heard.  Pulmonary/Chest: Effort normal and breath sounds normal.   Abdominal: Soft. There is no hepatosplenomegaly. There is no tenderness.   Neurological: He is alert and oriented to person, place, and time.   Skin: Skin is warm and dry. No cyanosis. Nails show no clubbing.   Psychiatric: He has a normal mood and affect. His behavior is normal.       Data Review:       ECG 12 Lead  Date/Time: 7/17/2018 12:25 PM  Performed by: ADELE GARCIAS  Authorized by: ADELE GARCIAS   Comparison: compared with previous ECG from 5/29/2018  Similar to previous ECG  Rhythm: sinus bradycardia  BPM: 59  Clinical impression: normal ECG  Comments: No change from prior EKG  QRS duration 102 MS  QT/ last 419 MS  WV interval 196 MS            Lab Results   Component Value Date    CHOL 204 (H) 06/11/2018    TRIG 204 (H) 06/11/2018    HDL 44 06/11/2018     (H) 06/11/2018    AST 18 06/10/2018    ALT 22 06/10/2018       Lab Results   Component Value Date    HGBA1C 6.40 (H) 06/10/2018            Problem List Items Addressed This Visit        Cardiovascular and Mediastinum    Coronary artery disease involving native coronary artery of native heart with angina pectoris (CMS/HCC)    Overview     · Myocardial infarction, 1987, data " deficit.  · CABG by Loi Kiser (3/22/2004):  LIMA graft to LAD. SVG to PDA. Sequential SVG to diagonal and obtuse marginal.   · Redo CABG by Gerardo Aguiar (4/17/2012): SVG to left circumflex. SVG to PDA. Mitral annuloplasty.  · Pharmacologic nuclear stress (5/22/18): Mostly fixed anterior apical and inferior lateral defects.  LVEF 49%.  · Cardiac catheterization (6/11/2018): Severe native 3 vessel CAD (diagonal branch of LAD, left circumflex, RCA). 3 grafts patent (LIMA to LAD, SVG to OM, SVG to RPDA). Successful balloon angioplasty without stenting of the SVG to OM).         Current Assessment & Plan     · Continue aspirin 81 mg daily  · Continue Coreg 25 mg twice a day  · Sublingual nitroglycerin as needed for any episodes of angina         VHD (valvular heart disease) - Primary    Overview     · Mitral ring annuloplasty for severe MR by Gerardo Aguiar, 4/17/2012   · Echocardiogram  (4/25/2013):  LVEF 55%; mild aortic stenosis and regurgitation.  · Echo (5/9/17): LVEF 55%.  Mild aortic stenosis.  Mitral annuloplasty band noted with moderate MR.         Current Assessment & Plan     · Stable NYHA class II symptoms  · We'll repeat echocardiogram in 2019 for evaluation of mitral valve         AAA (abdominal aortic aneurysm) (CMS/[facility])    Overview     · Abdominal aortic aneurysm, 2.8 x 3.6 cm, 04/25/2013  · Abdominal aortic aneurysm, 3.5 x 3.5 x 7 cm 05/07/2017         Current Assessment & Plan     · Continue to monitor AAA with routine CT scans         Essential hypertension    Overview     · Negative renal artery duplex, 06/28/2013         Current Assessment & Plan     · Hypertension is controlled  · Continue Coreg 25 mg twice a day  · Continue amlodipine 5 mg twice a day  · Continue hydrochlorothiazide 25 mg daily  · Continue lisinopril 40 mg day BID         Hyperlipidemia LDL goal <70    Overview     · Intolerance to multiple statins (Lipitor, Zocor, Crestor, Tricor, Niacin)         Current Assessment & Plan      · Defer statin therapy due to history of intolerance  · Patient refuses to try any more statin therapy due to severe myalgias         Premature ventricular contractions    Overview     · 24 Hour holter (5/9/2018): Sinus rhythm. Frequent PVCs accounting for 20% of total beats. Nonsustained VT of approximately 9 beats. No significant pauses.         Current Assessment & Plan     · Continue Coreg 25 mg twice a day  · If PVC's worsen or patient becomes symptomatic will refer to EP for consideration of ablation               The patient reports symptomatic improvement with his premature ventricular contractions since undergoing angioplasty of his SVG to OM branch.  We discussed referral to EP for consideration of evaluation however he and I both agree with the improvement in his symptoms this is not necessary at this time.  Ideally I would like for him to take Plavix however he is certain it was what was causing his symptoms.  He uses to take any further statin therapy.  Overall he feels he is doing fairly well and denies any chest pain or dyspnea.  We will continue his current medications.  He spent a long time expressing his dissatisfaction with me and feeling like no one took his symptoms seriously.  He does wish to stay with our practice and follow-up with Dr. Miramontes at his next appointment as he wants to verbalize his feelings.     · Continue current medications  · If becomes symptomatic with PVCs or they worsen consider referral to EP  · Defer statin therapy  · Follow-up 6 months or sooner if needed.    Brandy Arredondo, APRN  7/17/2018

## 2018-07-17 NOTE — ASSESSMENT & PLAN NOTE
· Hypertension is controlled  · Continue Coreg 25 mg twice a day  · Continue amlodipine 5 mg twice a day  · Continue hydrochlorothiazide 25 mg daily  · Continue lisinopril 40 mg day BID

## 2018-07-17 NOTE — ASSESSMENT & PLAN NOTE
· Continue Coreg 25 mg twice a day  · If PVC's worsen or patient becomes symptomatic will refer to EP for consideration of ablation

## 2018-07-17 NOTE — ASSESSMENT & PLAN NOTE
· Continue aspirin 81 mg daily  · Continue Coreg 25 mg twice a day  · Sublingual nitroglycerin as needed for any episodes of angina

## 2018-07-17 NOTE — ASSESSMENT & PLAN NOTE
· Defer statin therapy due to history of intolerance  · Patient refuses to try any more statin therapy due to severe myalgias

## 2018-07-17 NOTE — ASSESSMENT & PLAN NOTE
· Stable NYHA class II symptoms  · We'll repeat echocardiogram in 2019 for evaluation of mitral valve

## 2018-10-10 ENCOUNTER — TELEPHONE (OUTPATIENT)
Dept: NEUROSURGERY | Facility: CLINIC | Age: 73
End: 2018-10-10

## 2018-10-10 ENCOUNTER — PATIENT MESSAGE (OUTPATIENT)
Dept: NEUROSURGERY | Facility: CLINIC | Age: 73
End: 2018-10-10

## 2018-10-10 RX ORDER — METHYLPREDNISOLONE 4 MG/1
TABLET ORAL
Qty: 1 EACH | Refills: 0 | Status: SHIPPED | OUTPATIENT
Start: 2018-10-10 | End: 2019-02-05

## 2018-10-10 NOTE — TELEPHONE ENCOUNTER
Sounds like patient is having sciatica radiculopathy.  Patient can try Medrol Dosepak.  If this does not clear up his symptoms.  I can see him next week with flexion-extension x-rays.

## 2018-10-10 NOTE — TELEPHONE ENCOUNTER
Provider:  Dewayne  Caller: Merlin    Phone #:  [phone]  Surgery:  Lumbar Lami/Disc  Surgery Date:  10/25/17  Last visit:   11/13/17  Next visit: n/a    JARON:         Reason for call:             ----- Message from Merlin Pritchard sent at 10/10/2018  1:37 PM EDT -----  Regarding: Non-Urgent Medical Question  Contact: [phone]  I am having significant back problems lately and I'm not sure what to do. I think it may be sciatica as I have pain in the right lower hip, sometimes quite severe. I also have pain in the right thigh that radiates all the way down the right calf to my foot. I also get severe pain in the right knee area that feels like a nail being driven in it. Sometimes the exercises I was given back in 2009/2010 seem to help, sometimes it make it worse. All this is affecting my ability to get quality sleep and I have had 2 unexpected bouts of diarrhea in the past week.     I need some advise for my current problem as I don't think it's advisable to allow a chiropractor to manipulate my spine based on its condition.

## 2018-10-12 ENCOUNTER — TELEPHONE (OUTPATIENT)
Dept: NEUROSURGERY | Facility: CLINIC | Age: 73
End: 2018-10-12

## 2018-10-12 NOTE — TELEPHONE ENCOUNTER
----- Message from Merlin Pritchard sent at 10/10/2018  1:37 PM EDT -----  Regarding: Non-Urgent Medical Question  Contact: [phone]  I am having significant back problems lately and I'm not sure what to do. I think it may be sciatica as I have pain in the right lower hip, sometimes quite severe. I also have pain in the right thigh that radiates all the way down the right calf to my foot. I also get severe pain in the right knee area that feels like a nail being driven in it. Sometimes the exercises I was given back in 2009/2010 seem to help, sometimes it make it worse. All this is affecting my ability to get quality sleep and I have had 2 unexpected bouts of diarrhea in the past week.     I need some advise for my current problem as I don't think it's advisable to allow a chiropractor to manipulate my spine based on its condition.

## 2019-02-04 NOTE — PROGRESS NOTES
Encounter Date:02/05/2019    Patient ID: Merlin Pritchard is a 73 y.o. male who resides in [place], [place].     CC/Reason for visit:  Atrial Flutter; Premature ventricular contractions; Shortness of Breath; and Hypertension           Problem List Items Addressed This Visit        Cardiology Problems    Coronary artery disease involving native coronary artery of native heart with angina pectoris (CMS/[facility])    Overview     · Myocardial infarction, 1987, data deficit.  · CABG by Loi Kiser (3/22/2004):  LIMA graft to LAD. SVG to PDA. Sequential SVG to diagonal and obtuse marginal.   · Redo CABG by Gerardo Aguiar (4/17/2012): SVG to left circumflex. SVG to PDA. Mitral annuloplasty.  · Pharmacologic nuclear stress (5/22/18): Mostly fixed anterior apical and inferior lateral defects.  LVEF 49%.  · Cardiac catheterization (6/11/2018): Severe native 3 vessel CAD (diagonal branch of LAD, left circumflex, RCA). 3 grafts patent (LIMA to LAD, SVG to OM, SVG to RPDA). Successful balloon angioplasty without stenting of the SVG to OM).         Current Assessment & Plan     · No anginal symptoms  · Continue low-dose aspirin, beta blocker  · Shouldn't talk to statins         Relevant Medications    aspirin 81 MG EC tablet    carvedilol (COREG) 25 MG tablet    nitroglycerin (NITROSTAT) 0.4 MG SL tablet    sacubitril-valsartan (ENTRESTO) 49-51 MG tablet    Chronic systolic heart failure (CMS/[facility])    Overview     · LVEF 25%, (4/2012)  · Echo (5/22/2018):  LVEF 50%.  Mild to moderate aortic stenosis.  Mild MR         Current Assessment & Plan     · NYHA class II-III symptoms  · Will transition lisinopril to Entresto 49-51 mg twice a day  · Continue carvedilol         Relevant Medications    carvedilol (COREG) 25 MG tablet    nitroglycerin (NITROSTAT) 0.4 MG SL tablet    sacubitril-valsartan (ENTRESTO) 49-51 MG tablet    Other Relevant Orders    Basic Metabolic Panel    VHD (valvular heart disease)    Overview     · Mitral ring  annuloplasty for severe MR by Gerardo Aguiar, 4/17/2012   · Echocardiogram  (4/25/2013):  LVEF 55%; mild aortic stenosis and regurgitation.  · Echo (5/9/17): LVEF 55%.  Mild aortic stenosis.  Mitral annuloplasty band noted with moderate MR.  · Echo (5/22/2018):  LVEF 50%.  Mild to moderate aortic stenosis.  Mild MR         Relevant Medications    carvedilol (COREG) 25 MG tablet    nitroglycerin (NITROSTAT) 0.4 MG SL tablet    sacubitril-valsartan (ENTRESTO) 49-51 MG tablet    Essential hypertension    Overview     · Negative renal artery duplex, 06/28/2013         Current Assessment & Plan     · Uncontrolled  · Stop lisinopril  · Start Entresto 49-51 milligrams twice a day 72 hours after discontinuation of lisinopril  · Patient will contact the office in 2 weeks with reported blood pressures on Entresto. If still uncontrolled and renal function stable, will increase to highest dose.         Relevant Medications    carvedilol (COREG) 25 MG tablet    hydrochlorothiazide (HYDRODIURIL) 25 MG tablet    Hyperlipidemia LDL goal <70 - Primary    Overview     · Intolerance to multiple statins (Lipitor, Zocor, Crestor, Tricor, Niacin)         Current Assessment & Plan     · Statin intolerant  · Recommend fish oil or omega-3 fatty acid supplementation            Other    Type 2 diabetes mellitus without complication, with long-term current use of insulin (CMS/[facility])    Current Assessment & Plan     · Statin therapy and ARB therapy indicated given diabetic status                    · Stop lisinopril  · Start Entresto 49-51 milligrams twice a day 72 hours later  · BMP in 2 weeks  · Patient will contact the office in 2 weeks with blood pressures. If still elevated, will increase Entresto to highest dose with repeat labs  Return in about 6 months (around 8/5/2019).          Merlin Pritchard returns for his 6 month follow up of his coronary artery disease, chronic systolic heart failure, AAA, issue mitral valve repair, and cardiac risk  factors. The patient has had a stable clinical course. He still gets short of breath with moderate activities. He states his blood pressure still remains elevated at home despite maximized antihypertensive medical therapy. He is previously having issues with symptomatic PVCs which improved after PCI of a vein graft June last year.    Review of Systems   Constitution: Negative for weakness and malaise/fatigue.   Eyes: Negative for vision loss in left eye and vision loss in right eye.   Cardiovascular: Positive for irregular heartbeat. Negative for chest pain, dyspnea on exertion, near-syncope, orthopnea, palpitations, paroxysmal nocturnal dyspnea and syncope.   Respiratory: Positive for sleep disturbances due to breathing.    Musculoskeletal: Positive for back pain, muscle cramps, muscle weakness, myalgias, neck pain and stiffness.   Neurological: Negative for brief paralysis, excessive daytime sleepiness, focal weakness, numbness and paresthesias.   All other systems reviewed and are negative.      The patient's past medical, social, family history and ROS reviewed in the patient's electronic medical record.    Allergies  Crestor [rosuvastatin calcium]; Influenza vaccines; Lipitor [atorvastatin]; Naproxen; Niacin and related; Plavix [clopidogrel]; Pravachol [pravastatin sodium]; Tricor [fenofibrate]; and Zocor [simvastatin]    Outpatient Medications Marked as Taking for the 2/5/19 encounter (Office Visit) with Cordell Miramontes IV, MD   Medication Sig Dispense Refill   • amLODIPine (NORVASC) 5 MG tablet Take 5 mg by mouth 2 (Two) Times a Day.     • aspirin 81 MG EC tablet Take 1 tablet by mouth Daily 30 tablet 11   • carvedilol (COREG) 25 MG tablet Take 1 tablet by mouth 2 (Two) Times a Day With Meals. 180 tablet 3   • Coenzyme Q10 300 MG capsule Take 1 capsule by mouth Every Morning.     • fluticasone (FLONASE) 50 MCG/ACT nasal spray 2 sprays into each nostril Every Night.     • HUMALOG 100 UNIT/ML  "injection Inject 10-30 Units under the skin into the appropriate area as directed 3 (Three) Times a Day. 10-20 units before each meal.  Sliding scale     • hydrochlorothiazide (HYDRODIURIL) 25 MG tablet Take 1 tablet by mouth Every Morning. 90 tablet 3   • ibuprofen (ADVIL,MOTRIN) 800 MG tablet Take 1 tablet by mouth Every 6 (Six) Hours As Needed for Mild Pain . (Patient taking differently: Take 800 mg by mouth At Night As Needed.) 90 tablet 3   • LANTUS 100 UNIT/ML injection Inject 32 Units under the skin into the appropriate area as directed Every Night.     • MAGNESIUM CHLORIDE PO Take 1 capsule by mouth 2 (Two) Times a Day.     • NIACIN PO Take 500 mg by mouth.     • nitroglycerin (NITROSTAT) 0.4 MG SL tablet Place 1 tablet under the tongue Every 5 (Five) Minutes As Needed for Chest Pain. Take no more than 3 doses in 15 minutes. 25 tablet 5   • POTASSIUM CHLORIDE ER PO Take 1 tablet by mouth Every Morning. OTC     • traMADol (ULTRAM) 50 MG tablet Take 50 mg by mouth Every Night.     • TURMERIC PO Take 1 capsule by mouth 2 (Two) Times a Day.     • vitamin E 400 UNIT capsule Take 400 Units by mouth Every Morning.     • [DISCONTINUED] aspirin 81 MG EC tablet Take 1 tablet by mouth Daily 30 tablet 11   • [DISCONTINUED] carvedilol (COREG) 25 MG tablet Take 25 mg by mouth 2 (Two) Times a Day.     • [DISCONTINUED] hydrochlorothiazide (HYDRODIURIL) 25 MG tablet Take 25 mg by mouth Every Morning.     • [DISCONTINUED] lisinopril (PRINIVIL,ZESTRIL) 40 MG tablet Take 40 mg by mouth 2 (Two) Times a Day.     • [DISCONTINUED] nitroglycerin (NITROSTAT) 0.4 MG SL tablet Place 0.4 mg under the tongue Every 5 (Five) Minutes As Needed for Chest Pain. Take no more than 3 doses in 15 minutes.             Blood pressure 150/70, pulse 75, height 167.6 cm (66\"), weight 94 kg (207 lb 3.2 oz), SpO2 98 %.  Body mass index is 33.44 kg/m².  There were no vitals filed for this visit.    Physical Exam   Constitutional: He is oriented to " person, place, and time. He appears well-developed and well-nourished.   HENT:   Head: Normocephalic and atraumatic.   Eyes: Pupils are equal, round, and reactive to light. No scleral icterus.   Neck: No JVD present. Carotid bruit is not present. No thyromegaly present.   Cardiovascular: Normal rate, regular rhythm, S1 normal and S2 normal. Exam reveals no gallop.   Murmur heard.   Harsh midsystolic murmur is present with a grade of 2/6 at the upper right sternal border radiating to the neck.  Pulmonary/Chest: Effort normal and breath sounds normal.   Abdominal: Soft. There is no hepatosplenomegaly. There is no tenderness.   Neurological: He is alert and oriented to person, place, and time.   Skin: Skin is warm and dry. No cyanosis. Nails show no clubbing.   Psychiatric: He has a normal mood and affect. His behavior is normal.       Data Review:     Procedures    Lab Results   Component Value Date    CHOL 204 (H) 06/11/2018    TRIG 204 (H) 06/11/2018    HDL 44 06/11/2018     (H) 06/11/2018    AST 18 06/10/2018    ALT 22 06/10/2018       Lab Results   Component Value Date    HGBA1C 6.40 (H) 06/10/2018     Lab Results   Component Value Date    GLUCOSE 140 (H) 06/10/2018    BUN 21 06/10/2018    CREATININE 1.08 06/10/2018    EGFRIFNONA 67 06/10/2018    BCR 19.4 06/10/2018    K 5.0 06/10/2018    CO2 30.0 06/10/2018    CALCIUM 9.5 06/10/2018    ALBUMIN 4.16 06/10/2018    AST 18 06/10/2018    ALT 22 06/10/2018     Lab Results   Component Value Date    WBC 5.85 06/10/2018    HGB 15.4 06/10/2018    HCT 43.8 06/10/2018    MCV 91.1 06/10/2018    PLT 98 (L) 06/10/2018       Cordell Miramontes IV, MD  2/5/2019

## 2019-02-05 ENCOUNTER — OFFICE VISIT (OUTPATIENT)
Dept: CARDIOLOGY | Facility: CLINIC | Age: 74
End: 2019-02-05

## 2019-02-05 VITALS
HEART RATE: 75 BPM | WEIGHT: 207.2 LBS | SYSTOLIC BLOOD PRESSURE: 150 MMHG | DIASTOLIC BLOOD PRESSURE: 70 MMHG | BODY MASS INDEX: 33.3 KG/M2 | HEIGHT: 66 IN | OXYGEN SATURATION: 98 %

## 2019-02-05 DIAGNOSIS — E11.9 TYPE 2 DIABETES MELLITUS WITHOUT COMPLICATION, WITH LONG-TERM CURRENT USE OF INSULIN (HCC): ICD-10-CM

## 2019-02-05 DIAGNOSIS — I38 VHD (VALVULAR HEART DISEASE): ICD-10-CM

## 2019-02-05 DIAGNOSIS — Z79.4 TYPE 2 DIABETES MELLITUS WITHOUT COMPLICATION, WITH LONG-TERM CURRENT USE OF INSULIN (HCC): ICD-10-CM

## 2019-02-05 DIAGNOSIS — I50.22 CHRONIC SYSTOLIC HEART FAILURE (HCC): ICD-10-CM

## 2019-02-05 DIAGNOSIS — I25.119 CORONARY ARTERY DISEASE INVOLVING NATIVE CORONARY ARTERY OF NATIVE HEART WITH ANGINA PECTORIS (HCC): ICD-10-CM

## 2019-02-05 DIAGNOSIS — I10 ESSENTIAL HYPERTENSION: ICD-10-CM

## 2019-02-05 DIAGNOSIS — E78.5 HYPERLIPIDEMIA LDL GOAL <70: Primary | ICD-10-CM

## 2019-02-05 PROBLEM — I49.3 PREMATURE VENTRICULAR CONTRACTIONS: Status: RESOLVED | Noted: 2018-05-11 | Resolved: 2019-02-05

## 2019-02-05 PROCEDURE — 99214 OFFICE O/P EST MOD 30 MIN: CPT | Performed by: INTERNAL MEDICINE

## 2019-02-05 RX ORDER — HYDROCHLOROTHIAZIDE 25 MG/1
25 TABLET ORAL EVERY MORNING
Qty: 90 TABLET | Refills: 3 | Status: SHIPPED | OUTPATIENT
Start: 2019-02-05

## 2019-02-05 RX ORDER — CARVEDILOL 25 MG/1
25 TABLET ORAL 2 TIMES DAILY WITH MEALS
Qty: 180 TABLET | Refills: 3 | Status: SHIPPED | OUTPATIENT
Start: 2019-02-05 | End: 2020-01-01 | Stop reason: SDUPTHER

## 2019-02-05 RX ORDER — NITROGLYCERIN 0.4 MG/1
0.4 TABLET SUBLINGUAL
Qty: 25 TABLET | Refills: 5 | Status: SHIPPED | OUTPATIENT
Start: 2019-02-05 | End: 2020-01-01 | Stop reason: SDUPTHER

## 2019-02-05 RX ORDER — ASPIRIN 81 MG/1
81 TABLET ORAL DAILY
Qty: 30 TABLET | Refills: 11
Start: 2019-02-05 | End: 2020-01-01

## 2019-02-05 NOTE — ASSESSMENT & PLAN NOTE
· NYHA class II-III symptoms  · Will transition lisinopril to Entresto 49-51 mg twice a day  · Continue carvedilol

## 2019-02-05 NOTE — ASSESSMENT & PLAN NOTE
· Uncontrolled  · Stop lisinopril  · Start Entresto 49-51 milligrams twice a day 72 hours after discontinuation of lisinopril  · Patient will contact the office in 2 weeks with reported blood pressures on Entresto. If still uncontrolled and renal function stable, will increase to highest dose.

## 2019-02-07 ENCOUNTER — DOCUMENTATION (OUTPATIENT)
Dept: CARDIOLOGY | Facility: CLINIC | Age: 74
End: 2019-02-07

## 2019-02-21 DIAGNOSIS — I50.22 CHRONIC SYSTOLIC HEART FAILURE (HCC): ICD-10-CM

## 2019-02-22 DIAGNOSIS — I50.22 CHRONIC SYSTOLIC HEART FAILURE (HCC): ICD-10-CM

## 2019-09-10 ENCOUNTER — OFFICE VISIT (OUTPATIENT)
Dept: CARDIOLOGY | Facility: CLINIC | Age: 74
End: 2019-09-10

## 2019-09-10 ENCOUNTER — LAB (OUTPATIENT)
Dept: LAB | Facility: HOSPITAL | Age: 74
End: 2019-09-10

## 2019-09-10 VITALS
SYSTOLIC BLOOD PRESSURE: 110 MMHG | DIASTOLIC BLOOD PRESSURE: 60 MMHG | BODY MASS INDEX: 33.43 KG/M2 | HEART RATE: 66 BPM | WEIGHT: 213 LBS | OXYGEN SATURATION: 94 % | HEIGHT: 67 IN

## 2019-09-10 DIAGNOSIS — I50.22 CHRONIC SYSTOLIC HEART FAILURE (HCC): ICD-10-CM

## 2019-09-10 DIAGNOSIS — I25.119 CORONARY ARTERY DISEASE INVOLVING NATIVE CORONARY ARTERY OF NATIVE HEART WITH ANGINA PECTORIS (HCC): Primary | ICD-10-CM

## 2019-09-10 DIAGNOSIS — I35.0 NONRHEUMATIC AORTIC VALVE STENOSIS: ICD-10-CM

## 2019-09-10 DIAGNOSIS — I10 ESSENTIAL HYPERTENSION: ICD-10-CM

## 2019-09-10 DIAGNOSIS — E78.5 HYPERLIPIDEMIA LDL GOAL <70: ICD-10-CM

## 2019-09-10 LAB
ANION GAP SERPL CALCULATED.3IONS-SCNC: 11.6 MMOL/L (ref 5–15)
BUN BLD-MCNC: 11 MG/DL (ref 8–23)
BUN/CREAT SERPL: 11.5 (ref 7–25)
CALCIUM SPEC-SCNC: 9.4 MG/DL (ref 8.6–10.5)
CHLORIDE SERPL-SCNC: 101 MMOL/L (ref 98–107)
CO2 SERPL-SCNC: 27.4 MMOL/L (ref 22–29)
CREAT BLD-MCNC: 0.96 MG/DL (ref 0.76–1.27)
GFR SERPL CREATININE-BSD FRML MDRD: 77 ML/MIN/1.73
GLUCOSE BLD-MCNC: 124 MG/DL (ref 65–99)
NT-PROBNP SERPL-MCNC: 1250 PG/ML (ref 5–900)
POTASSIUM BLD-SCNC: 4.4 MMOL/L (ref 3.5–5.2)
SODIUM BLD-SCNC: 140 MMOL/L (ref 136–145)

## 2019-09-10 PROCEDURE — 80048 BASIC METABOLIC PNL TOTAL CA: CPT

## 2019-09-10 PROCEDURE — 36415 COLL VENOUS BLD VENIPUNCTURE: CPT

## 2019-09-10 PROCEDURE — 99214 OFFICE O/P EST MOD 30 MIN: CPT | Performed by: INTERNAL MEDICINE

## 2019-09-10 PROCEDURE — 83880 ASSAY OF NATRIURETIC PEPTIDE: CPT

## 2019-09-10 RX ORDER — AMLODIPINE BESYLATE 5 MG/1
5 TABLET ORAL DAILY
Qty: 90 TABLET | Refills: 3 | Status: SHIPPED | OUTPATIENT
Start: 2019-09-10 | End: 2020-01-01

## 2019-09-10 RX ORDER — FUROSEMIDE 40 MG/1
40 TABLET ORAL DAILY
Qty: 7 TABLET | Refills: 0 | Status: SHIPPED | OUTPATIENT
Start: 2019-09-10 | End: 2019-09-17 | Stop reason: SDUPTHER

## 2019-09-10 RX ORDER — POTASSIUM CHLORIDE 750 MG/1
10 TABLET, FILM COATED, EXTENDED RELEASE ORAL DAILY
Qty: 7 TABLET | Refills: 0 | Status: SHIPPED | OUTPATIENT
Start: 2019-09-10 | End: 2019-09-17 | Stop reason: SDUPTHER

## 2019-09-10 NOTE — ASSESSMENT & PLAN NOTE
· Worsening NYHA class III symptoms of dyspnea  · Obtain BNP today  · Reduce amlodipine to 5 mg daily  · Increase Entresto to  mg twice daily  · Lasix 40 mg daily for 7 days  · BMP in 2 weeks

## 2019-09-10 NOTE — PROGRESS NOTES
[facility] Cardiology    Encounter Date:09/10/2019    Patient ID: Merlin Pritchard is a  74 y.o. male who resides in [place], [place].    CC/Reason for visit:  Coronary Artery Disease and Shortness of Breath           Problem List Items Addressed This Visit        Cardiology Problems    Coronary artery disease involving native coronary artery of native heart with angina pectoris (CMS/[facility]) - Primary    Overview     · Myocardial infarction, 1987, data deficit.  · CABG by Loi Kiser (3/22/2004):  LIMA graft to LAD. SVG to PDA. Sequential SVG to diagonal and obtuse marginal.   · Redo CABG by Gerardo Aguiar (4/17/2012): SVG to left circumflex. SVG to PDA. Mitral annuloplasty.  · Pharmacologic nuclear stress (5/22/18): Mostly fixed anterior apical and inferior lateral defects.  LVEF 49%.  · Cardiac catheterization (6/11/2018): Severe native 3-vessel CAD (diagonal branch of LAD, left circumflex, RCA). 3 grafts patent (LIMA to LAD, SVG to OM, SVG to RPDA). Successful balloon angioplasty without stenting of the SVG to OM).         Current Assessment & Plan     · Atypical chest pain symptoms which improve with belching and flatulence not consistent with angina  · Continue low-dose aspirin, beta-blocker, and statin therapy         Relevant Medications    amLODIPine (NORVASC) 5 MG tablet    sacubitril-valsartan (ENTRESTO)  MG tablet    Chronic systolic heart failure (CMS/[facility])    Overview     · LVEF 25%, (4/2012)  · Echo (5/22/2018):  LVEF 50%.  Mild to moderate aortic stenosis.  Mild MR         Current Assessment & Plan     · Worsening NYHA class III symptoms of dyspnea  · Obtain BNP today  · Reduce amlodipine to 5 mg daily  · Increase Entresto to  mg twice daily  · Lasix 40 mg daily for 7 days  · BMP in 2 weeks         Relevant Medications    amLODIPine (NORVASC) 5 MG tablet    furosemide (LASIX) 40 MG tablet    potassium chloride (K-DUR) 10 MEQ CR tablet    sacubitril-valsartan  (ENTRESTO)  MG tablet    Other Relevant Orders    proBNP    Basic Metabolic Panel    Adult Transthoracic Echo Complete W/ Cont if Necessary Per Protocol    Nonrheumatic aortic valve stenosis    Overview     · Mitral ring annuloplasty for severe MR by Gerardo Aguiar, 4/17/2012   · Echocardiogram  (4/25/2013):  LVEF 55%; mild aortic stenosis and regurgitation.  · Echo (5/9/17): LVEF 55%.  Mild aortic stenosis.  Mitral annuloplasty band noted with moderate MR.  · Echo (5/22/2018):  LVEF 50%.  Mild to moderate aortic stenosis.  Mild MR         Current Assessment & Plan     · Repeat echo at next visit         Relevant Medications    amLODIPine (NORVASC) 5 MG tablet    sacubitril-valsartan (ENTRESTO)  MG tablet    Other Relevant Orders    Adult Transthoracic Echo Complete W/ Cont if Necessary Per Protocol    Essential hypertension    Overview     · Negative renal artery duplex, 06/28/2013         Relevant Medications    amLODIPine (NORVASC) 5 MG tablet    furosemide (LASIX) 40 MG tablet    Hyperlipidemia LDL goal <70    Overview     · Intolerance to multiple statins (Lipitor, Zocor, Crestor, Tricor, Niacin)                    · Lasix 40 mg daily for next 7 days  · Reduce amlodipine to 5 mg daily  · Increase Entresto to  mg twice daily  · proBNP today  · BMP in 2 weeks  · Lab results reviewed with patient  · Echo in 1 year prior to visit  Return in about 12 months (around 9/10/2020) for Follow-up with HTR only.          Merlin Pritchard returns to my office today for follow-up of his coronary artery disease, aortic stenosis, hypertension, and hyperlipidemia.  The patient states that he has been noticing increased shortness of breath with activities.  Patient denies orthopnea or PND.  He denies exertional chest discomfort to suggest angina.  He has been having nonexertional chest discomforts which improved with belching or flatulence.  The patient has not used sublingual nitroglycerin.    Review of Systems    Constitution: Negative for weakness and malaise/fatigue.   Eyes: Negative for vision loss in left eye and vision loss in right eye.   Cardiovascular: Positive for dyspnea on exertion. Negative for chest pain, near-syncope, orthopnea, palpitations, paroxysmal nocturnal dyspnea and syncope.        Shortness of breath   Respiratory: Positive for shortness of breath.    Musculoskeletal: Negative for myalgias.   Neurological: Negative for brief paralysis, excessive daytime sleepiness, focal weakness, numbness and paresthesias.   All other systems reviewed and are negative.      The patient's past medical, social, family history and ROS reviewed in the patient's electronic medical record.    Allergies  Crestor [rosuvastatin calcium]; Influenza vaccines; Lipitor [atorvastatin]; Naproxen; Niacin and related; Plavix [clopidogrel]; Pravachol [pravastatin sodium]; Tricor [fenofibrate]; and Zocor [simvastatin]    Outpatient Medications Marked as Taking for the 9/10/19 encounter (Office Visit) with Cordell Miramontes IV, MD   Medication Sig Dispense Refill   • carvedilol (COREG) 25 MG tablet Take 1 tablet by mouth 2 (Two) Times a Day With Meals. 180 tablet 3   • Coenzyme Q10 300 MG capsule Take 1 capsule by mouth Every Morning.     • fluticasone (FLONASE) 50 MCG/ACT nasal spray 2 sprays into each nostril Every Night.     • HUMALOG 100 UNIT/ML injection Inject 5-20 Units under the skin into the appropriate area as directed 3 (Three) Times a Day. 10-20 units before each meal.  Sliding scale     • hydrochlorothiazide (HYDRODIURIL) 25 MG tablet Take 1 tablet by mouth Every Morning. 90 tablet 3   • LANTUS 100 UNIT/ML injection Inject 33 Units under the skin into the appropriate area as directed Every Night.     • MAGNESIUM CHLORIDE PO Take 1 capsule by mouth 2 (Two) Times a Day.     • nitroglycerin (NITROSTAT) 0.4 MG SL tablet Place 1 tablet under the tongue Every 5 (Five) Minutes As Needed for Chest Pain. Take no more than  "3 doses in 15 minutes. 25 tablet 5   • traMADol (ULTRAM) 50 MG tablet Take 50 mg by mouth Every Night.     • TURMERIC PO Take 1 capsule by mouth 2 (Two) Times a Day.     • vitamin E 400 UNIT capsule Take 400 Units by mouth Every Morning.     • [DISCONTINUED] amLODIPine (NORVASC) 5 MG tablet Take 5 mg by mouth 2 (Two) Times a Day.     • [DISCONTINUED] sacubitril-valsartan (ENTRESTO) 49-51 MG tablet Take 1 tablet by mouth 2 (Two) Times a Day. 180 tablet 2           Blood pressure 110/60, pulse 66, height 168.9 cm (66.5\"), weight 96.6 kg (213 lb), SpO2 94 %.  Body mass index is 33.86 kg/m².  Vitals:    09/10/19 0924   Patient Position: Sitting       Physical Exam   Constitutional: He is oriented to person, place, and time. He appears well-developed and well-nourished.   HENT:   Head: Normocephalic and atraumatic.   Eyes: Pupils are equal, round, and reactive to light. No scleral icterus.   Neck: No JVD present. Carotid bruit is not present. No thyromegaly present.   Cardiovascular: Normal rate, regular rhythm, S1 normal and S2 normal. Exam reveals no gallop.   Murmur heard.   Harsh midsystolic murmur is present with a grade of 2/6 at the upper right sternal border radiating to the neck.  Pulmonary/Chest: Effort normal and breath sounds normal.   Abdominal: Soft. There is no hepatosplenomegaly. There is no tenderness.   Neurological: He is alert and oriented to person, place, and time.   Skin: Skin is warm and dry. No cyanosis. Nails show no clubbing.   Psychiatric: He has a normal mood and affect. His behavior is normal.       Data Review:     BMP:  Glucose 142  BUN 18.8  Creatinine 1.0  eGFR >59  Na 137  K 4.4  Cl 101.7  CO2 28.0  Ca 9.3     Procedures          H. C. Girma Miramontes MD [facility], [facility]  Interventional and General Cardiology        "

## 2019-09-10 NOTE — ASSESSMENT & PLAN NOTE
· Atypical chest pain symptoms which improve with belching and flatulence not consistent with angina  · Continue low-dose aspirin, beta-blocker, and statin therapy

## 2019-09-10 NOTE — PROGRESS NOTES
Labs suggest CHF as cause of SOB.  If he feels better after 7 days of lasix, we may need to switch him to daily lasix 20 and stop HCTZ.

## 2019-09-17 DIAGNOSIS — Z79.899 LONG-TERM USE OF HIGH-RISK MEDICATION: Primary | ICD-10-CM

## 2019-09-17 DIAGNOSIS — I50.22 CHRONIC SYSTOLIC HEART FAILURE (HCC): ICD-10-CM

## 2019-09-17 RX ORDER — POTASSIUM CHLORIDE 750 MG/1
TABLET, FILM COATED, EXTENDED RELEASE ORAL
Qty: 45 TABLET | Refills: 0 | Status: SHIPPED | OUTPATIENT
Start: 2019-09-17 | End: 2019-11-20 | Stop reason: SDUPTHER

## 2019-09-17 RX ORDER — FUROSEMIDE 40 MG/1
TABLET ORAL
Qty: 45 TABLET | Refills: 0 | Status: SHIPPED | OUTPATIENT
Start: 2019-09-17 | End: 2019-11-20 | Stop reason: SDUPTHER

## 2019-10-22 ENCOUNTER — TELEPHONE (OUTPATIENT)
Dept: CARDIOLOGY | Facility: CLINIC | Age: 74
End: 2019-10-22

## 2019-10-22 NOTE — TELEPHONE ENCOUNTER
The patient was started on Lasix and potassium.  He was supposed to have a BMP but it is not been performed.  I will have our nurse send out a reminder.    HARDY López

## 2019-11-20 DIAGNOSIS — I50.22 CHRONIC SYSTOLIC HEART FAILURE (HCC): ICD-10-CM

## 2019-11-20 RX ORDER — FUROSEMIDE 40 MG/1
TABLET ORAL
Qty: 45 TABLET | Refills: 3 | Status: SHIPPED | OUTPATIENT
Start: 2019-11-20 | End: 2020-01-01 | Stop reason: SDUPTHER

## 2019-11-20 RX ORDER — POTASSIUM CHLORIDE 750 MG/1
TABLET, FILM COATED, EXTENDED RELEASE ORAL
Qty: 45 TABLET | Refills: 3 | Status: SHIPPED | OUTPATIENT
Start: 2019-11-20 | End: 2020-01-01

## 2020-01-01 ENCOUNTER — HOSPITAL ENCOUNTER (OUTPATIENT)
Dept: MRI IMAGING | Facility: HOSPITAL | Age: 75
Discharge: HOME | End: 2020-02-25
Admitting: INTERNAL MEDICINE

## 2020-01-01 ENCOUNTER — LAB (OUTPATIENT)
Dept: LAB | Facility: HOSPITAL | Age: 75
End: 2020-01-01

## 2020-01-01 ENCOUNTER — OFFICE VISIT (OUTPATIENT)
Dept: CARDIOLOGY | Facility: CLINIC | Age: 75
End: 2020-01-01

## 2020-01-01 ENCOUNTER — TELEMEDICINE (OUTPATIENT)
Dept: NEUROSURGERY | Facility: CLINIC | Age: 75
End: 2020-01-01

## 2020-01-01 ENCOUNTER — PATIENT MESSAGE (OUTPATIENT)
Dept: NEUROSURGERY | Facility: CLINIC | Age: 75
End: 2020-01-01

## 2020-01-01 ENCOUNTER — TELEPHONE (OUTPATIENT)
Dept: NEUROSURGERY | Facility: CLINIC | Age: 75
End: 2020-01-01

## 2020-01-01 ENCOUNTER — OFFICE VISIT (OUTPATIENT)
Dept: CARDIAC SURGERY | Facility: CLINIC | Age: 75
End: 2020-01-01

## 2020-01-01 ENCOUNTER — TRANSCRIBE ORDERS (OUTPATIENT)
Dept: ADMINISTRATIVE | Facility: HOSPITAL | Age: 75
End: 2020-01-01

## 2020-01-01 ENCOUNTER — TELEPHONE (OUTPATIENT)
Dept: CARDIOLOGY | Facility: CLINIC | Age: 75
End: 2020-01-01

## 2020-01-01 ENCOUNTER — HOSPITAL ENCOUNTER (OUTPATIENT)
Dept: CARDIOLOGY | Facility: HOSPITAL | Age: 75
Discharge: HOME | End: 2020-09-29

## 2020-01-01 ENCOUNTER — PATIENT MESSAGE (OUTPATIENT)
Dept: CARDIOLOGY | Facility: CLINIC | Age: 75
End: 2020-01-01

## 2020-01-01 VITALS
OXYGEN SATURATION: 98 % | HEART RATE: 61 BPM | HEIGHT: 66 IN | WEIGHT: 205 LBS | BODY MASS INDEX: 32.95 KG/M2 | TEMPERATURE: 98.3 F | DIASTOLIC BLOOD PRESSURE: 67 MMHG | SYSTOLIC BLOOD PRESSURE: 122 MMHG

## 2020-01-01 VITALS
TEMPERATURE: 96.9 F | WEIGHT: 206.6 LBS | DIASTOLIC BLOOD PRESSURE: 62 MMHG | BODY MASS INDEX: 32.43 KG/M2 | HEART RATE: 62 BPM | HEIGHT: 67 IN | OXYGEN SATURATION: 96 % | SYSTOLIC BLOOD PRESSURE: 118 MMHG

## 2020-01-01 VITALS
WEIGHT: 201 LBS | HEIGHT: 67 IN | HEART RATE: 68 BPM | SYSTOLIC BLOOD PRESSURE: 140 MMHG | OXYGEN SATURATION: 99 % | TEMPERATURE: 96.9 F | DIASTOLIC BLOOD PRESSURE: 70 MMHG | BODY MASS INDEX: 31.55 KG/M2

## 2020-01-01 VITALS — WEIGHT: 201 LBS | HEIGHT: 67 IN | BODY MASS INDEX: 31.55 KG/M2

## 2020-01-01 VITALS — WEIGHT: 201 LBS | BODY MASS INDEX: 31.55 KG/M2 | HEIGHT: 67 IN

## 2020-01-01 DIAGNOSIS — I25.119 CORONARY ARTERY DISEASE INVOLVING NATIVE CORONARY ARTERY OF NATIVE HEART WITH ANGINA PECTORIS (HCC): ICD-10-CM

## 2020-01-01 DIAGNOSIS — E11.9 TYPE 2 DIABETES MELLITUS WITHOUT COMPLICATION, WITH LONG-TERM CURRENT USE OF INSULIN (HCC): ICD-10-CM

## 2020-01-01 DIAGNOSIS — K83.8 PAPILLARY MASS OF BILIARY TRACT: ICD-10-CM

## 2020-01-01 DIAGNOSIS — K83.8 PAPILLARY MASS OF BILIARY TRACT: Primary | ICD-10-CM

## 2020-01-01 DIAGNOSIS — I50.22 CHRONIC SYSTOLIC HEART FAILURE (HCC): ICD-10-CM

## 2020-01-01 DIAGNOSIS — I73.9 VASCULAR CLAUDICATION (HCC): ICD-10-CM

## 2020-01-01 DIAGNOSIS — Z79.4 TYPE 2 DIABETES MELLITUS WITHOUT COMPLICATION, WITH LONG-TERM CURRENT USE OF INSULIN (HCC): ICD-10-CM

## 2020-01-01 DIAGNOSIS — I71.40 ABDOMINAL AORTIC ANEURYSM (AAA) WITHOUT RUPTURE (HCC): ICD-10-CM

## 2020-01-01 DIAGNOSIS — I73.9 PVD (PERIPHERAL VASCULAR DISEASE) (HCC): ICD-10-CM

## 2020-01-01 DIAGNOSIS — I35.0 NONRHEUMATIC AORTIC (VALVE) STENOSIS: Primary | ICD-10-CM

## 2020-01-01 DIAGNOSIS — I35.0 NONRHEUMATIC AORTIC (VALVE) STENOSIS: ICD-10-CM

## 2020-01-01 DIAGNOSIS — M48.062 LUMBAR STENOSIS WITH NEUROGENIC CLAUDICATION: ICD-10-CM

## 2020-01-01 DIAGNOSIS — I71.40 ABDOMINAL AORTIC ANEURYSM (AAA) WITHOUT RUPTURE (HCC): Primary | ICD-10-CM

## 2020-01-01 DIAGNOSIS — I35.0 NONRHEUMATIC AORTIC VALVE STENOSIS: ICD-10-CM

## 2020-01-01 DIAGNOSIS — I35.0 NONRHEUMATIC AORTIC VALVE STENOSIS: Primary | ICD-10-CM

## 2020-01-01 DIAGNOSIS — I10 ESSENTIAL HYPERTENSION: ICD-10-CM

## 2020-01-01 DIAGNOSIS — E78.5 HYPERLIPIDEMIA LDL GOAL <70: ICD-10-CM

## 2020-01-01 DIAGNOSIS — I73.9 PERIPHERAL VASCULAR DISEASE (HCC): ICD-10-CM

## 2020-01-01 LAB
ALBUMIN SERPL-MCNC: 4 G/DL (ref 3.5–5.2)
ALBUMIN/GLOB SERPL: 1.4 G/DL
ALP SERPL-CCNC: 71 U/L (ref 39–117)
ALT SERPL W P-5'-P-CCNC: 15 U/L (ref 1–41)
ANION GAP SERPL CALCULATED.3IONS-SCNC: 10.3 MMOL/L (ref 5–15)
ANION GAP SERPL CALCULATED.3IONS-SCNC: 10.7 MMOL/L (ref 5–15)
ASCENDING AORTA: 2.7 CM
AST SERPL-CCNC: 15 U/L (ref 1–40)
BH CV ECHO MEAS - AO MAX PG (FULL): 35.3 MMHG
BH CV ECHO MEAS - AO MAX PG: 41 MMHG
BH CV ECHO MEAS - AO MEAN PG (FULL): 20.6 MMHG
BH CV ECHO MEAS - AO MEAN PG: 22 MMHG
BH CV ECHO MEAS - AO ROOT AREA (BSA CORRECTED): 1.7
BH CV ECHO MEAS - AO ROOT AREA: 9.6 CM^2
BH CV ECHO MEAS - AO ROOT DIAM: 3.5 CM
BH CV ECHO MEAS - AO V2 MAX: 321 CM/SEC
BH CV ECHO MEAS - AO V2 MEAN: 216.2 CM/SEC
BH CV ECHO MEAS - AO V2 VTI: 81.5 CM
BH CV ECHO MEAS - AVA(I,A): 0.79 CM^2
BH CV ECHO MEAS - AVA(I,D): 0.79 CM^2
BH CV ECHO MEAS - AVA(V,A): 0.67 CM^2
BH CV ECHO MEAS - AVA(V,D): 0.67 CM^2
BH CV ECHO MEAS - BSA(HAYCOCK): 2.1 M^2
BH CV ECHO MEAS - BSA: 2 M^2
BH CV ECHO MEAS - BZI_BMI: 31.5 KILOGRAMS/M^2
BH CV ECHO MEAS - BZI_METRIC_HEIGHT: 170 CM
BH CV ECHO MEAS - BZI_METRIC_WEIGHT: 91.2 KG
BH CV ECHO MEAS - EDV(CUBED): 117.6 ML
BH CV ECHO MEAS - EDV(MOD-SP2): 155 ML
BH CV ECHO MEAS - EDV(MOD-SP4): 145 ML
BH CV ECHO MEAS - EDV(TEICH): 112.8 ML
BH CV ECHO MEAS - EF(CUBED): 49.6 %
BH CV ECHO MEAS - EF(MOD-BP): 50.9 %
BH CV ECHO MEAS - EF(MOD-SP2): 51.6 %
BH CV ECHO MEAS - EF(MOD-SP4): 54.8 %
BH CV ECHO MEAS - EF(TEICH): 41.6 %
BH CV ECHO MEAS - ESV(CUBED): 59.3 ML
BH CV ECHO MEAS - ESV(MOD-SP2): 75 ML
BH CV ECHO MEAS - ESV(MOD-SP4): 65.6 ML
BH CV ECHO MEAS - ESV(TEICH): 65.9 ML
BH CV ECHO MEAS - FS: 20.4 %
BH CV ECHO MEAS - IVS/LVPW: 1.2
BH CV ECHO MEAS - IVSD: 1.4 CM
BH CV ECHO MEAS - LA DIMENSION: 4.3 CM
BH CV ECHO MEAS - LA/AO: 1.2
BH CV ECHO MEAS - LAD MAJOR: 5.9 CM
BH CV ECHO MEAS - LAT PEAK E' VEL: 7.2 CM/SEC
BH CV ECHO MEAS - LV DIASTOLIC VOL/BSA (35-75): 71.6 ML/M^2
BH CV ECHO MEAS - LV MASS(C)D: 253.7 GRAMS
BH CV ECHO MEAS - LV MASS(C)DI: 125.3 GRAMS/M^2
BH CV ECHO MEAS - LV MAX PG: 2.7 MMHG
BH CV ECHO MEAS - LV MEAN PG: 1 MMHG
BH CV ECHO MEAS - LV SYSTOLIC VOL/BSA (12-30): 32.4 ML/M^2
BH CV ECHO MEAS - LV V1 MAX: 81.6 CM/SEC
BH CV ECHO MEAS - LV V1 MEAN: 43.9 CM/SEC
BH CV ECHO MEAS - LV V1 VTI: 25.3 CM
BH CV ECHO MEAS - LVIDD: 4.9 CM
BH CV ECHO MEAS - LVIDS: 3.9 CM
BH CV ECHO MEAS - LVLD AP2: 9.5 CM
BH CV ECHO MEAS - LVLD AP4: 9.6 CM
BH CV ECHO MEAS - LVLS AP2: 7.6 CM
BH CV ECHO MEAS - LVLS AP4: 8.6 CM
BH CV ECHO MEAS - LVOT AREA (M): 2.5 CM^2
BH CV ECHO MEAS - LVOT AREA: 2.5 CM^2
BH CV ECHO MEAS - LVOT DIAM: 1.8 CM
BH CV ECHO MEAS - LVPWD: 1.2 CM
BH CV ECHO MEAS - MED PEAK E' VEL: 4.1 CM/SEC
BH CV ECHO MEAS - MR MAX PG: 119 MMHG
BH CV ECHO MEAS - MR MAX VEL: 544.5 CM/SEC
BH CV ECHO MEAS - MR MEAN PG: 64 MMHG
BH CV ECHO MEAS - MR MEAN VEL: 373.5 CM/SEC
BH CV ECHO MEAS - MR VTI: 194 CM
BH CV ECHO MEAS - MV DEC SLOPE: 350 CM/SEC^2
BH CV ECHO MEAS - MV MAX PG: 8.5 MMHG
BH CV ECHO MEAS - MV MEAN PG: 3 MMHG
BH CV ECHO MEAS - MV P1/2T MAX VEL: 141 CM/SEC
BH CV ECHO MEAS - MV P1/2T: 118 MSEC
BH CV ECHO MEAS - MV V2 MAX: 145.5 CM/SEC
BH CV ECHO MEAS - MV V2 MEAN: 81 CM/SEC
BH CV ECHO MEAS - MV V2 VTI: 47.8 CM
BH CV ECHO MEAS - MVA P1/2T LCG: 1.6 CM^2
BH CV ECHO MEAS - MVA(P1/2T): 1.9 CM^2
BH CV ECHO MEAS - MVA(VTI): 1.3 CM^2
BH CV ECHO MEAS - PA ACC TIME: 0.1 SEC
BH CV ECHO MEAS - PA PR(ACCEL): 34.5 MMHG
BH CV ECHO MEAS - SI(AO): 387.4 ML/M^2
BH CV ECHO MEAS - SI(CUBED): 28.8 ML/M^2
BH CV ECHO MEAS - SI(LVOT): 31.8 ML/M^2
BH CV ECHO MEAS - SI(MOD-SP2): 39.5 ML/M^2
BH CV ECHO MEAS - SI(MOD-SP4): 39.2 ML/M^2
BH CV ECHO MEAS - SI(TEICH): 23.2 ML/M^2
BH CV ECHO MEAS - SV(AO): 784.5 ML
BH CV ECHO MEAS - SV(CUBED): 58.3 ML
BH CV ECHO MEAS - SV(LVOT): 64.4 ML
BH CV ECHO MEAS - SV(MOD-SP2): 80 ML
BH CV ECHO MEAS - SV(MOD-SP4): 79.4 ML
BH CV ECHO MEAS - SV(TEICH): 46.9 ML
BH CV ECHO MEAS - TAPSE (>1.6): 1.6 CM
BH CV VAS BP LEFT ARM: NORMAL MMHG
BH CV XLRA - RV BASE: 4.3 CM
BH CV XLRA - RV LENGTH: 7.1 CM
BH CV XLRA - RV MID: 2.9 CM
BH CV XLRA - TDI S': 8.4 CM/SEC
BILIRUB SERPL-MCNC: 0.7 MG/DL (ref 0–1.2)
BUN SERPL-MCNC: 17 MG/DL (ref 8–23)
BUN SERPL-MCNC: 20 MG/DL (ref 8–23)
BUN/CREAT SERPL: 16 (ref 7–25)
BUN/CREAT SERPL: 16.5 (ref 7–25)
CALCIUM SPEC-SCNC: 9.3 MG/DL (ref 8.6–10.5)
CALCIUM SPEC-SCNC: 9.7 MG/DL (ref 8.6–10.5)
CHLORIDE SERPL-SCNC: 100 MMOL/L (ref 98–107)
CHLORIDE SERPL-SCNC: 100 MMOL/L (ref 98–107)
CO2 SERPL-SCNC: 28.7 MMOL/L (ref 22–29)
CO2 SERPL-SCNC: 30.3 MMOL/L (ref 22–29)
CREAT BLDA-MCNC: 1.1 MG/DL (ref 0.6–1.3)
CREAT SERPL-MCNC: 1.06 MG/DL (ref 0.76–1.27)
CREAT SERPL-MCNC: 1.21 MG/DL (ref 0.76–1.27)
DEPRECATED RDW RBC AUTO: 44.5 FL (ref 37–54)
ERYTHROCYTE [DISTWIDTH] IN BLOOD BY AUTOMATED COUNT: 12.8 % (ref 12.3–15.4)
GFR SERPL CREATININE-BSD FRML MDRD: 58 ML/MIN/1.73
GFR SERPL CREATININE-BSD FRML MDRD: 68 ML/MIN/1.73
GLOBULIN UR ELPH-MCNC: 2.9 GM/DL
GLUCOSE SERPL-MCNC: 101 MG/DL (ref 65–99)
GLUCOSE SERPL-MCNC: 86 MG/DL (ref 65–99)
HCT VFR BLD AUTO: 44.2 % (ref 37.5–51)
HGB BLD-MCNC: 15 G/DL (ref 13–17.7)
LV EF 2D ECHO EST: 45 %
MAXIMAL PREDICTED HEART RATE: 145 BPM
MCH RBC QN AUTO: 32.3 PG (ref 26.6–33)
MCHC RBC AUTO-ENTMCNC: 33.9 G/DL (ref 31.5–35.7)
MCV RBC AUTO: 95.3 FL (ref 79–97)
NT-PROBNP SERPL-MCNC: 632.4 PG/ML (ref 0–1800)
PLATELET # BLD AUTO: 136 10*3/MM3 (ref 140–450)
PMV BLD AUTO: 11.4 FL (ref 6–12)
POTASSIUM SERPL-SCNC: 4.1 MMOL/L (ref 3.5–5.2)
POTASSIUM SERPL-SCNC: 4.2 MMOL/L (ref 3.5–5.2)
PROT SERPL-MCNC: 6.9 G/DL (ref 6–8.5)
RBC # BLD AUTO: 4.64 10*6/MM3 (ref 4.14–5.8)
SODIUM SERPL-SCNC: 139 MMOL/L (ref 136–145)
SODIUM SERPL-SCNC: 141 MMOL/L (ref 136–145)
STRESS TARGET HR: 123 BPM
WBC # BLD AUTO: 7.45 10*3/MM3 (ref 3.4–10.8)

## 2020-01-01 PROCEDURE — 99204 OFFICE O/P NEW MOD 45 MIN: CPT | Performed by: THORACIC SURGERY (CARDIOTHORACIC VASCULAR SURGERY)

## 2020-01-01 PROCEDURE — 36415 COLL VENOUS BLD VENIPUNCTURE: CPT

## 2020-01-01 PROCEDURE — 93923 UPR/LXTR ART STDY 3+ LVLS: CPT | Performed by: THORACIC SURGERY (CARDIOTHORACIC VASCULAR SURGERY)

## 2020-01-01 PROCEDURE — 93306 TTE W/DOPPLER COMPLETE: CPT | Performed by: INTERNAL MEDICINE

## 2020-01-01 PROCEDURE — 93306 TTE W/DOPPLER COMPLETE: CPT

## 2020-01-01 PROCEDURE — 25010000002 SULFUR HEXAFLUORIDE MICROSPH 60.7-25 MG RECONSTITUTED SUSPENSION: Performed by: INTERNAL MEDICINE

## 2020-01-01 PROCEDURE — 0 GADOBENATE DIMEGLUMINE 529 MG/ML SOLUTION: Performed by: INTERNAL MEDICINE

## 2020-01-01 PROCEDURE — 80048 BASIC METABOLIC PNL TOTAL CA: CPT

## 2020-01-01 PROCEDURE — 85027 COMPLETE CBC AUTOMATED: CPT

## 2020-01-01 PROCEDURE — A9577 INJ MULTIHANCE: HCPCS | Performed by: INTERNAL MEDICINE

## 2020-01-01 PROCEDURE — 80053 COMPREHEN METABOLIC PANEL: CPT

## 2020-01-01 PROCEDURE — 83880 ASSAY OF NATRIURETIC PEPTIDE: CPT

## 2020-01-01 PROCEDURE — 99213 OFFICE O/P EST LOW 20 MIN: CPT | Performed by: THORACIC SURGERY (CARDIOTHORACIC VASCULAR SURGERY)

## 2020-01-01 PROCEDURE — 99443 PR PHYS/QHP TELEPHONE EVALUATION 21-30 MIN: CPT | Performed by: PHYSICIAN ASSISTANT

## 2020-01-01 PROCEDURE — 74183 MRI ABD W/O CNTR FLWD CNTR: CPT

## 2020-01-01 PROCEDURE — 99214 OFFICE O/P EST MOD 30 MIN: CPT | Performed by: INTERNAL MEDICINE

## 2020-01-01 PROCEDURE — 82565 ASSAY OF CREATININE: CPT

## 2020-01-01 RX ORDER — EMPAGLIFLOZIN 10 MG/1
1 TABLET, FILM COATED ORAL DAILY
Qty: 90 TABLET | Refills: 1 | Status: SHIPPED | OUTPATIENT
Start: 2020-01-01 | End: 2020-01-01

## 2020-01-01 RX ORDER — NITROGLYCERIN 0.4 MG/1
0.4 TABLET SUBLINGUAL
Qty: 25 TABLET | Refills: 5 | Status: SHIPPED | OUTPATIENT
Start: 2020-01-01

## 2020-01-01 RX ORDER — CARVEDILOL 25 MG/1
25 TABLET ORAL 2 TIMES DAILY WITH MEALS
Qty: 180 TABLET | Refills: 3 | Status: SHIPPED | OUTPATIENT
Start: 2020-01-01

## 2020-01-01 RX ORDER — METHOCARBAMOL 750 MG/1
750 TABLET, FILM COATED ORAL 3 TIMES DAILY PRN
COMMUNITY
Start: 2020-01-01

## 2020-01-01 RX ORDER — EMPAGLIFLOZIN 10 MG/1
1 TABLET, FILM COATED ORAL DAILY
Qty: 90 TABLET | Refills: 1 | Status: SHIPPED | OUTPATIENT
Start: 2020-01-01

## 2020-01-01 RX ORDER — INSULIN GLARGINE 100 [IU]/ML
32 INJECTION, SOLUTION SUBCUTANEOUS NIGHTLY
Start: 2020-01-01

## 2020-01-01 RX ORDER — FUROSEMIDE 20 MG/1
20 TABLET ORAL DAILY
Qty: 90 TABLET | Refills: 1 | Status: SHIPPED | OUTPATIENT
Start: 2020-01-01

## 2020-01-01 RX ORDER — AMLODIPINE BESYLATE 5 MG/1
TABLET ORAL
Qty: 90 TABLET | Refills: 3 | Status: SHIPPED | OUTPATIENT
Start: 2020-01-01

## 2020-01-01 RX ADMIN — GADOBENATE DIMEGLUMINE 19 ML: 529 INJECTION, SOLUTION INTRAVENOUS at 15:39

## 2020-01-01 RX ADMIN — SULFUR HEXAFLUORIDE 3 ML: KIT at 09:00

## 2020-03-30 NOTE — TELEPHONE ENCOUNTER
Per pt: No problems with bowel or bladder and no numbness around penile/anus area. Just weakness and tingling in the legs for a short while when first standing up and walking. I get some relief when I do waist high backward bends against the couch to push the spine forward. It's one of the exercises from therapy after my first surgery Oct. 2009. Taking a Robaxin and 1 or 2 Tramadol also helps. I have a prescription for the Tramadol and I tried one of my wife's Robaxin because it mentions being a skeletal muscle relaxant and seems more effective than Skelaxin.

## 2020-03-30 NOTE — TELEPHONE ENCOUNTER
Provider: Dewayne   Caller: Merlin-MY CHART MSG  Time of call: 12:26p  Phone #:  [phone]  Surgery:  Lumbar foraminotomy L4-5  Surgery Date:  10/25/17  Last visit:   11/13/17  Next visit: ZANA NORIEGAPER:       04/11/2019 Tramadol Hcl 50MG 1945 270 90 Mariama Aguiar [facility] [place] [facility] 1  07/08/2019 Tramadol Hcl 50MG 1945 270 90 Mariama Aguiar [facility] [place] 15 1  10/08/2019 Tramadol Hcl 50MG 1945 270 90 Mariama Aguiar [facility] [place] 15 1  01/26/2020 Tramadol Hcl  50MG/50MG/50MG/50MG/50MG/  1945 270 90 Mariama Aguiar [facility] [place] 15 1      Reason for call:       ----- Message from Merlin Pritchard sent at 3/28/2020 12:26 PM EDT -----  Regarding: Non-Urgent Medical Question  Contact: [phone]  I believe I am experiencing more back related problems and wondered if I need to get a current MRI for you to review.  My legs are weak after sitting and they tingle when trying to walk.  This goes away if I move slowly and pause briefly.  I don't experience this after arising from bed. Do you think Robaxin and tramadol would be beneficial? I would have submitted this to TAMIKO Montes De Oca if I were able to select his name for messages and I am comfortable corresponding with Rudy.

## 2020-03-31 PROBLEM — I73.9 VASCULAR CLAUDICATION (HCC): Status: ACTIVE | Noted: 2020-01-01

## 2020-03-31 NOTE — PROGRESS NOTES
Consults    Patient Care Team:  Mariama Aguiar MD as PCP - General (Internal Medicine)  Justin Buchanan MD as Surgeon (Neurosurgery)  Cordell Miramontes IV, MD as Referring Physician (Cardiology)    Chief Complaint: Pain in legs with walking    Subjective .     History of present illness:       Patient is a very nice 74-year-old gentleman who is well-known to the neurosurgical practice for having a history of a multiple level lumbar decompression.  Patient then presented back with left leg sciatica and foot drop.  Patient underwent a repeat foraminotomy of left L4-5 with nerve root exploration.  Patient had a good recovery has been doing very well until recently.  Over the last few weeks he has noticed having increased pain while walking.    After teasing out a lot of the details patient has pain while walking.  He improves while walking slowly and his pain gets better when he stands still.  Patient's pain actually worsens when he sits.    This is a very interesting story that would be compelling for vascular claudication as opposed to neurogenic claudication.  Patient does have a history of a AAA as well as cardiac stenosis and peripheral artery disease.     I am going to send him to a vascular surgeon for work-up for vascular claudication.       Review of Systems   Constitutional: Negative for activity change, appetite change, chills, fatigue and fever.   HENT: Negative for congestion, dental problem, ear pain, hearing loss, sinus pressure and tinnitus.    Eyes: Negative for pain and redness.   Respiratory: Negative for apnea, cough, shortness of breath and wheezing.    Cardiovascular: Negative for chest pain, palpitations and leg swelling.   Gastrointestinal: Negative for abdominal distention, abdominal pain, blood in stool, constipation, diarrhea, nausea and vomiting.   Endocrine: Negative for cold intolerance, heat intolerance and polyuria.   Genitourinary: Negative for enuresis, frequency and  urgency.   Musculoskeletal: Positive for gait problem ( Pain in calves while walking).   Skin: Negative for color change and rash.   Neurological: Negative for dizziness, tremors, seizures, syncope, speech difficulty, weakness, light-headedness, numbness and headaches.   Psychiatric/Behavioral: Negative for behavioral problems and confusion. The patient is not nervous/anxious.        History  Past Medical History:   Diagnosis Date   • AAA (abdominal aortic aneurysm) (CMS/HCC) 02/28/2017    under observation    • Atrial flutter (CMS/HCC) 02/28/2017    fixed with anular ring in place    • Back pain    • Cancer (CMS/HCC)     basal cell removed from left ear   1998- had to do 3 separate times to get completely gone    • Cataract     bilat - mild - in center- still present    • CHF (congestive heart failure) (CMS/HCC)    • Dyslipidemia 2/28/2017   • Heartburn     depending on what eat- no major issue    • Hypertension 2/28/2017   • IDDM (insulin dependent diabetes mellitus) (CMS/HCC) 2/28/2017    checks sugar 6 times per day    • IHD (ischemic heart disease) 2/28/2017   • MI (myocardial infarction) (CMS/[facility])     june 87   • MVP (mitral valve prolapse)     anular ring in place   • Numbness and tingling     from wasit down from back issues    • MICHEL (obstructive sleep apnea) 02/28/2017    20s years ago-  pt believes only had this when used statins so unsure why in system since no real issues- no machine or o2    • SOB (shortness of breath) on exertion     from exhaustion    • Thrombocytopenia (CMS/[facility]) 2/28/2017   • Tooth wear     tooth implant -  2-3 in from bottom    • VHD (valvular heart disease) 2/28/2017   • Wears glasses    ,   Past Surgical History:   Procedure Laterality Date   • APPENDECTOMY     • BACK SURGERY      lumbar lami   • BASAL CELL CARCINOMA EXCISION       from face.-  mole procedure then had to have several other times to get completely removed    • CARDIAC CATHETERIZATION     • CARDIAC CATHETERIZATION  N/A 2018    Procedure: Left Heart Cath;  Surgeon: Cordell Miramontes IV, MD;  Location:  [facility];  Service: Cardiovascular   • CARDIAC CATHETERIZATION N/A 2018    Procedure: Angioplasty-coronary;  Surgeon: Cordell Miramontes IV, MD;  Location:  [facility];  Service: Cardiovascular   • COLONOSCOPY     • CORONARY ARTERY BYPASS GRAFT  03/22/2004    x4. redo 2-vessel CABG in    • ENDOSCOPY     • INTERVENTIONAL RADIOLOGY PROCEDURE N/A 2017    Procedure: IR myelogram lumbar spine;  Surgeon: Jay Ellison MD;  Location:  [facility];  Service:    • LUMBAR DISCECTOMY N/A 10/25/2017    Procedure: lumbar foraminotomy L4 5;  Surgeon: Justin Buchanan MD;  Location:  [facility] OR;  Service:    • MITRAL VALVE ANNULOPLASTY  2012   • TONSILLECTOMY AND ADENOIDECTOMY      4-4 yo   • VASECTOMY     • WISDOM TOOTH EXTRACTION      - all 4   ,   Family History   Problem Relation Age of Onset   • Other Mother         back pain   • Stroke Mother    • Heart disease Father    • No Known Problems Sister    • No Known Problems Brother    • No Known Problems Maternal Grandmother    • Heart disease Maternal Grandfather    • No Known Problems Paternal Grandmother    • No Known Problems Paternal Grandfather    ,   Social History     Tobacco Use   • Smoking status: Former Smoker     Packs/day: 2.00     Years: 12.00     Pack years: 24.00     Types: Cigarettes     Last attempt to quit: 1973     Years since quittin.9   • Smokeless tobacco: Never Used   • Tobacco comment: just social    Substance Use Topics   • Alcohol use: Yes     Alcohol/week: 3.0 standard drinks     Types: 2 Glasses of wine, 1 Shots of liquor per week     Comment: 2 glasses of wine nightly and occas a shot of whiskey   • Drug use: No   ,   (Not in a hospital admission), Scheduled Meds:  , Continuous Infusions:    No current facility-administered medications for this visit. ,  PRN Meds:   and Allergies:  Crestor [rosuvastatin calcium]; Influenza vaccines; Lipitor [atorvastatin]; Naproxen; Niacin and related; Plavix [clopidogrel]; Pravachol [pravastatin sodium]; Tricor [fenofibrate]; and Zocor [simvastatin]   SMOKING STATUS: Former smoker  Objective     Vital Signs      There is no height or weight on file to calculate BMI.    Physical Exam:   No exam able to be performed due to video visit malfunction    Results Review:   I reviewed the patient's new imaging results and agree with the interpretation.  Discussed with old/2009 MRI reviewed.    Shows egregious lumbar spinal stenosis.    Myelogram from 2017 reviewed showed left L4-5 disease      Assessment/Plan     Patient is a very interesting history that suggests vascular claudication.  With his longstanding history of spinal stenosis I am not ruling this out as a contributor, however his predominant symptoms are pain in his calves while walking at a brisk pace.  If he stands still the pain subsides and he is able to continue walking.    I am going to send him to a vascular surgeon for work-up.  I would be happy to order a CTA with runoff if vascular surgery sees fit.  Patient may be able to be treated outpatient.  Patient also likely needs to get imaging of his aorta secondary to history of AAA.    I discussed the patients findings and my recommendations with patient    Rudy Montes De Oca PA-C  03/31/20  11:24    Time: 45 minutes    Unable to complete visit using a video connection to the patient. A phone visit was used to complete this visits. Total time of discussion was 45 minutes.      Video visit failed to connect therefore transition to telephone call.  I spent 45 minutes with patient.

## 2020-04-27 NOTE — PROGRESS NOTES
04/27/2020  Patient Information  Merlin Pritchard                                                                                          [address] [facility] [address]   1945  'PCP/Referring Physician'  Mariama Aguiar MD  [phone]  Rudy Montes De Oca PA-C  [phone]  Chief Complaint   Patient presents with   • Consult     Former patient referred back for an abdominal aortic aneurysm and peripheral vascular disease,compains of weakness and tingling in his legs.   • Peripheral Vascular Disease   • Aortic Aneurysm       History of Present Illness:  The patient is a 74-year-old white male who is been referred from Dr. Mariama Aguiar.  The patient has been having claudication pain.  This occurs after he walks greater than a block.  It occurs more in his right leg.  It does occur in his calf, which is consistent with claudication.  He denies any trauma.  He is a diabetic but his hemoglobin A1c has been in the 6 range.  He has had previous coronary bypass surgery and redo coronary bypass surgery by me in 2012.  He had a mitral valve ring for mitral valve regurg at that time.  He is followed closely by Dr. Miramontes and has been doing well with that.    Patient Active Problem List   Diagnosis   • Coronary artery disease involving native coronary artery of native heart with angina pectoris (CMS/HCC)   • Nonrheumatic aortic valve stenosis   • Atrial flutter (CMS/HCC)   • AAA (abdominal aortic aneurysm) (CMS/HCC)   • Essential hypertension   • Hyperlipidemia LDL goal <70   • Type 2 diabetes mellitus without complication, with long-term current use of insulin (CMS/HCC)   • COPD (chronic obstructive pulmonary disease) (CMS/HCC)   • MICHEL (obstructive sleep apnea)   • Thrombocytopenia (CMS/HCC)   • Lumbar stenosis with neurogenic claudication   • Class 1 obesity in adult   • Chronic systolic heart failure (CMS/HCC)   • Vascular claudication (CMS/HCC)   • PVD (peripheral vascular disease) (CMS/HCC)     Past Medical  History:   Diagnosis Date   • AAA (abdominal aortic aneurysm) (CMS/[facility]) 02/28/2017    under observation    • Atrial flutter (CMS/[facility]) 02/28/2017    fixed with anular ring in place    • Back pain    • Cancer (CMS/[facility])     basal cell removed from left ear   1998- had to do 3 separate times to get completely gone    • Cataract     bilat - mild - in center- still present    • CHF (congestive heart failure) (CMS/[facility])    • Coronary artery disease    • Dyslipidemia 2/28/2017   • Heartburn     depending on what eat- no major issue    • Hiatal hernia    • Hypertension 2/28/2017   • IDDM (insulin dependent diabetes mellitus) (CMS/[facility]) 2/28/2017    checks sugar 6 times per day    • IHD (ischemic heart disease) 2/28/2017   • MI (myocardial infarction) (CMS/HCC)     june 87   • MVP (mitral valve prolapse)     anular ring in place   • Numbness and tingling     from wasit down from back issues    • MICHEL (obstructive sleep apnea) 02/28/2017    20s years ago-  pt believes only had this when used statins so unsure why in system since no real issues- no machine or o2    • SOB (shortness of breath) on exertion     from exhaustion    • Thrombocytopenia (CMS/[facility]) 2/28/2017   • Tooth wear     tooth implant -  2-3 in from bottom    • VHD (valvular heart disease) 2/28/2017   • Wears glasses      Past Surgical History:   Procedure Laterality Date   • APPENDECTOMY     • BACK SURGERY      lumbar lami   • BASAL CELL CARCINOMA EXCISION       from face.-  mole procedure then had to have several other times to get completely removed    • CARDIAC CATHETERIZATION     • CARDIAC CATHETERIZATION N/A 6/11/2018    Procedure: Left Heart Cath;  Surgeon: Cordell Miramontes IV, MD;  Location:  [facility];  Service: Cardiovascular   • CARDIAC CATHETERIZATION N/A 6/11/2018    Procedure: Angioplasty-coronary;  Surgeon: Cordell Miramontes IV, MD;  Location:  [facility];  Service: Cardiovascular   • CATARACT  EXTRACTION, BILATERAL     • COLONOSCOPY     • CORONARY ARTERY BYPASS GRAFT  03/22/2004    x4. redo 2-vessel CABG in 2012   • ENDOSCOPY     • INTERVENTIONAL RADIOLOGY PROCEDURE N/A 9/19/2017    Procedure: IR myelogram lumbar spine;  Surgeon: Jay Ellison MD;  Location:  [facility];  Service:    • LUMBAR DISCECTOMY N/A 10/25/2017    Procedure: lumbar foraminotomy L4 5;  Surgeon: Justni Buchanan MD;  Location:  [facility] OR;  Service:    • MITRAL VALVE ANNULOPLASTY  04/17/2012   • TONSILLECTOMY AND ADENOIDECTOMY      4-4 yo   • VASECTOMY     • WISDOM TOOTH EXTRACTION      1965- all 4       Current Outpatient Medications:   •  amLODIPine (NORVASC) 5 MG tablet, Take 1 tablet by mouth Daily., Disp: 90 tablet, Rfl: 3  •  carvedilol (COREG) 25 MG tablet, Take 1 tablet by mouth 2 (Two) Times a Day With Meals., Disp: 180 tablet, Rfl: 3  •  fluticasone (FLONASE) 50 MCG/ACT nasal spray, 2 sprays into each nostril Every Night., Disp: , Rfl:   •  furosemide (LASIX) 40 MG tablet, 1 tablet PO every other day, Disp: 45 tablet, Rfl: 3  •  HUMALOG 100 UNIT/ML injection, Inject 5-20 Units under the skin into the appropriate area as directed 3 (Three) Times a Day. 10-20 units before each meal.  Sliding scale, Disp: , Rfl:   •  hydrochlorothiazide (HYDRODIURIL) 25 MG tablet, Take 1 tablet by mouth Every Morning., Disp: 90 tablet, Rfl: 3  •  LANTUS 100 UNIT/ML injection, Inject 33 Units under the skin into the appropriate area as directed Every Night., Disp: , Rfl:   •  potassium chloride (K-DUR) 10 MEQ CR tablet, 1 tablet PO every other day, Disp: 45 tablet, Rfl: 3  •  sacubitril-valsartan (ENTRESTO)  MG tablet, Take 1 tablet by mouth 2 (Two) Times a Day., Disp: 180 tablet, Rfl: 1  •  traMADol (ULTRAM) 50 MG tablet, Take 50 mg by mouth Every Night., Disp: , Rfl:   •  Coenzyme Q10 300 MG capsule, Take 1 capsule by mouth Every Morning., Disp: , Rfl:   •  MAGNESIUM CHLORIDE PO, Take 1 capsule by mouth 2 (Two)  Times a Day., Disp: , Rfl:   •  nitroglycerin (NITROSTAT) 0.4 MG SL tablet, Place 1 tablet under the tongue Every 5 (Five) Minutes As Needed for Chest Pain. Take no more than 3 doses in 15 minutes., Disp: 25 tablet, Rfl: 5  •  TURMERIC PO, Take 1 capsule by mouth 2 (Two) Times a Day., Disp: , Rfl:   •  vitamin E 400 UNIT capsule, Take 400 Units by mouth Every Morning., Disp: , Rfl:   Allergies   Allergen Reactions   • Crestor [Rosuvastatin Calcium] Myalgia   • Influenza Vaccines Other (See Comments)     URI   • Lipitor [Atorvastatin] Myalgia   • Naproxen Other (See Comments)     Edema in left leg mostly    • Niacin And Related Myalgia   • Plavix [Clopidogrel] Myalgia   • Pravachol [Pravastatin Sodium] Myalgia   • Tricor [Fenofibrate] Myalgia   • Zocor [Simvastatin] Myalgia     Social History     Socioeconomic History   • Marital status:      Spouse name: Not on file   • Number of children: 0   • Years of education: Not on file   • Highest education level: Not on file   Occupational History   • 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   Tobacco Use   • Smoking status: Former Smoker     Packs/day: 2.00     Years: 12.00     Pack years: 24.00     Types: Cigarettes     Last attempt to quit: 1973     Years since quittin.0   • Smokeless tobacco: Never Used   • Tobacco comment: just social    Substance and Sexual Activity   • Alcohol use: Yes     Alcohol/week: 3.0 standard drinks     Types: 2 Glasses of wine, 1 Shots of liquor per week     Comment: 2 glasses of wine nightly and occas a shot of whiskey   • Drug use: No   • Sexual activity: Defer     Partners: Female     Comment: spouse   Social History Narrative    Caffeine: 2 servings per day    Patient lives at home with his spouse in [place]     Family History   Problem Relation Age of Onset   • Other Mother         back pain   • Stroke Mother    • Heart disease Father    • No Known Problems Sister    • No Known Problems Brother    • No Known Problems  "Maternal Grandmother    • Heart disease Maternal Grandfather    • No Known Problems Paternal Grandmother    • No Known Problems Paternal Grandfather      Review of Systems   Constitution: Negative. Negative for chills, fever, malaise/fatigue, night sweats and weight loss.   HENT: Negative for odynophagia and sore throat.    Eyes: Negative.    Cardiovascular: Positive for claudication and dyspnea on exertion. Negative for chest pain, leg swelling, orthopnea and palpitations.   Respiratory: Negative.  Negative for cough and hemoptysis.    Endocrine: Negative.  Negative for cold intolerance, heat intolerance, polydipsia, polyphagia and polyuria.   Hematologic/Lymphatic: Bruises/bleeds easily.   Skin: Negative.  Negative for itching and rash.   Musculoskeletal: Positive for back pain, joint pain and muscle weakness. Negative for joint swelling and myalgias.   Gastrointestinal: Negative.  Negative for abdominal pain, constipation, diarrhea, hematemesis, hematochezia, melena, nausea and vomiting.   Genitourinary: Negative.  Negative for dysuria, frequency and hematuria.   Neurological: Negative.  Negative for focal weakness, headaches, numbness and seizures.   Psychiatric/Behavioral: Negative.  Negative for suicidal ideas.   Allergic/Immunologic: Positive for environmental allergies.   All other systems reviewed and are negative.    Vitals:    04/27/20 1043   BP: 122/67   BP Location: Right arm   Patient Position: Sitting   Pulse: 61   Temp: 98.3 °F (36.8 °C)   SpO2: 98%   Weight: 93 kg (205 lb)   Height: 167.6 cm (66\")      Physical Exam   Constitutional: He is oriented to person, place, and time. He appears well-developed and well-nourished. No distress.   HENT:   Head: Normocephalic.   Eyes: Pupils are equal, round, and reactive to light. EOM are normal.   Neck: Normal range of motion. Carotid bruit is not present. No thyromegaly present.   Cardiovascular: Normal rate and regular rhythm. Exam reveals no gallop and no " friction rub.   No murmur heard.  Pulses:       Dorsalis pedis pulses are 0 on the right side, and 1+ on the left side.        Posterior tibial pulses are 1+ on the right side, and 1+ on the left side.   Pulmonary/Chest: He has no wheezes. He has no rales.   Abdominal: Soft. Bowel sounds are normal. He exhibits no distension and no mass. There is no hepatomegaly. There is no tenderness.   Musculoskeletal: Normal range of motion. He exhibits no deformity.   Neurological: He is alert and oriented to person, place, and time. He has normal strength. No cranial nerve deficit or sensory deficit.   Skin: No bruising and no petechiae noted. No cyanosis. Nails show no clubbing.   Psychiatric: He has a normal mood and affect.       Labs/Imaging:   PERIPHERAL VASCULAR STUDY      RIGHT SIDE:  The right brachial pressure is 108, right thigh 107, right above knee 133, right below knee 135, right ankle 95.  Right GARCÍA of 0.88.    LEFT SIDE:  The left brachial pressure is 108, left thigh 101, left above knee 126, left below knee 144, left ankle 113.  Left GARCÍA of 1.05.    PVR INTERPRETATION/ASSESSMENT:    The pressures on the right leg is ankle-brachial index is 0.88.  Pressures on the left leg ankle-brachial index is 1.05.  The patient has abnormal waveforms on the right leg, consistent with femoral distal occlusive disease.  The patient appears to have normal pressures in his left leg with normal waveforms.  The patient has an what appears to be moderate femoral distal occlusive disease of his right leg.    Assessment/Plan:   The patient is a 74 year old male who is referred at this time for peripheral vascular disease.  This appears to be more so in his right leg.  I have examined the patient.  He has an ulceration.  He has no rest pain.  He is able to walk and do most activities.  At this point, I feel we need to start the patient on Pletal 100 mg twice daily.  We will also start him back on aspirin 81 mg.  We will see him back  in about 3 months.  I encouraged him to follow-up with Dr. Mariama Aguiar.  As always, if he has problems he will contact her or me.    Patient Active Problem List   Diagnosis   • Coronary artery disease involving native coronary artery of native heart with angina pectoris (CMS/HCC)   • Nonrheumatic aortic valve stenosis   • Atrial flutter (CMS/HCC)   • AAA (abdominal aortic aneurysm) (CMS/HCC)   • Essential hypertension   • Hyperlipidemia LDL goal <70   • Type 2 diabetes mellitus without complication, with long-term current use of insulin (CMS/HCC)   • COPD (chronic obstructive pulmonary disease) (CMS/HCC)   • MICHEL (obstructive sleep apnea)   • Thrombocytopenia (CMS/HCC)   • Lumbar stenosis with neurogenic claudication   • Class 1 obesity in adult   • Chronic systolic heart failure (CMS/HCC)   • Vascular claudication (CMS/HCC)   • PVD (peripheral vascular disease) (CMS/HCC)     CC: MD Asia Urena, , editing for Tres Aguiar M.D.    I, Tres Aguiar MD, have read and agree with the editing done by Asia Ramirez, .

## 2020-04-28 PROBLEM — I73.9 PVD (PERIPHERAL VASCULAR DISEASE) (HCC): Status: ACTIVE | Noted: 2020-01-01

## 2020-04-30 NOTE — TELEPHONE ENCOUNTER
Merlin, you are correct.  Cilostazol should not be used in heart failure patients.  One reason I may not have recognized this is because I do not find it on your active medicine list in my chart.  While I am not sure this cause your recent shortness of breath, I recommend you stop this medicine.    Girma Miramontes MD        From: Merlin Pritchard  To: Cordell Miramontes IV, MD  Sent: 4/30/2020  9:07 AM EDT  Subject: Prescription Question    Do you know if taking cilostazol (Pletal) 100mg tablets BID are a serious risk for me?  The drug information warns that it is contraindicated in heart failure patients, but I can't find much clarification about this.

## 2020-09-09 NOTE — PROGRESS NOTES
09/09/2020  Patient Information  Merlin Pritchard                                                                                          [address]  [facility] [address]   1945  'PCP/Referring Physician'  Mariama Aguiar MD  [phone]  No ref. provider found    Chief Complaint   Patient presents with   • Follow-up     F/u for PVD and AAA. Pt complains of being SOA        History of Present Illness:   The patient returns today for follow-up of peripheral vascular disease and an abdominal aortic aneurysm.  His legs overall appears to be doing reasonably well.  He is back in cardiac rehab.  His most recent ankle-brachial index was 0.88 on the right leg and 1.05 on the left.  He is not smoking.  His most recent assessment of his aneurysm was 3.9 on a recent study.      Patient Active Problem List   Diagnosis   • Coronary artery disease involving native coronary artery of native heart with angina pectoris (CMS/HCC)   • Nonrheumatic aortic valve stenosis   • Atrial flutter (CMS/HCC)   • Abdominal aortic aneurysm (AAA) without rupture (CMS/HCC)   • Essential hypertension   • Hyperlipidemia LDL goal <70   • Type 2 diabetes mellitus without complication, with long-term current use of insulin (CMS/HCC)   • COPD (chronic obstructive pulmonary disease) (CMS/HCC)   • MICHEL (obstructive sleep apnea)   • Thrombocytopenia (CMS/HCC)   • Lumbar stenosis with neurogenic claudication   • Class 1 obesity in adult   • Chronic systolic heart failure (CMS/HCC)   • Vascular claudication (CMS/HCC)   • Peripheral vascular disease (CMS/HCC)     Past Medical History:   Diagnosis Date   • AAA (abdominal aortic aneurysm) (CMS/HCC) 02/28/2017    under observation    • Atrial flutter (CMS/HCC) 02/28/2017    fixed with anular ring in place    • Back pain    • Cancer (CMS/HCC)     basal cell removed from left ear   1998- had to do 3 separate times to get completely gone    • Cataract     bilat - mild - in center- still present    • CHF  (congestive heart failure) (CMS/[facility])    • Coronary artery disease    • Dyslipidemia 2/28/2017   • Heartburn     depending on what eat- no major issue    • Hiatal hernia    • Hypertension 2/28/2017   • IDDM (insulin dependent diabetes mellitus) 2/28/2017    checks sugar 6 times per day    • IHD (ischemic heart disease) 2/28/2017   • MI (myocardial infarction) (CMS/[facility])     june 87   • MVP (mitral valve prolapse)     anular ring in place   • Numbness and tingling     from wasit down from back issues    • MICHEL (obstructive sleep apnea) 02/28/2017    20s years ago-  pt believes only had this when used statins so unsure why in system since no real issues- no machine or o2    • SOB (shortness of breath) on exertion     from exhaustion    • Thrombocytopenia (CMS/[facility]) 2/28/2017   • Tooth wear     tooth implant -  2-3 in from bottom    • VHD (valvular heart disease) 2/28/2017   • Wears glasses      Past Surgical History:   Procedure Laterality Date   • APPENDECTOMY     • BACK SURGERY      lumbar lami   • BASAL CELL CARCINOMA EXCISION       from face.-  mole procedure then had to have several other times to get completely removed    • CARDIAC CATHETERIZATION     • CARDIAC CATHETERIZATION N/A 6/11/2018    Procedure: Left Heart Cath;  Surgeon: Cordell Miramontes IV, MD;  Location:  [facility];  Service: Cardiovascular   • CARDIAC CATHETERIZATION N/A 6/11/2018    Procedure: Angioplasty-coronary;  Surgeon: Cordell Miramontes IV, MD;  Location:  [facility];  Service: Cardiovascular   • CATARACT EXTRACTION, BILATERAL     • COLONOSCOPY     • CORONARY ARTERY BYPASS GRAFT  03/22/2004    x4. redo 2-vessel CABG in 2012   • ENDOSCOPY     • INTERVENTIONAL RADIOLOGY PROCEDURE N/A 9/19/2017    Procedure: IR myelogram lumbar spine;  Surgeon: Jay Ellison MD;  Location:  [facility];  Service:    • LUMBAR DISCECTOMY N/A 10/25/2017    Procedure: lumbar foraminotomy L4 5;   Surgeon: Justin Buchanan MD;  Location: [facility];  Service:    • MITRAL VALVE ANNULOPLASTY  04/17/2012   • TONSILLECTOMY AND ADENOIDECTOMY      4-6 yo   • VASECTOMY     • WISDOM TOOTH EXTRACTION      1965- all 4       Current Outpatient Medications:   •  amLODIPine (NORVASC) 5 MG tablet, TAKE 1 TABLET DAILY, Disp: 90 tablet, Rfl: 3  •  aspirin 325 MG EC tablet, Take 325 mg by mouth Daily., Disp: , Rfl:   •  carvedilol (COREG) 25 MG tablet, Take 1 tablet by mouth 2 (Two) Times a Day With Meals., Disp: 180 tablet, Rfl: 3  •  Coenzyme Q10 300 MG capsule, Take 1 capsule by mouth Every Morning., Disp: , Rfl:   •  fluticasone (FLONASE) 50 MCG/ACT nasal spray, 2 sprays into each nostril Every Night., Disp: , Rfl:   •  furosemide (LASIX) 40 MG tablet, 1 tablet PO every other day, Disp: 45 tablet, Rfl: 3  •  HUMALOG 100 UNIT/ML injection, Inject 5-20 Units under the skin into the appropriate area as directed 3 (Three) Times a Day. 10-20 units before each meal.  Sliding scale, Disp: , Rfl:   •  hydrochlorothiazide (HYDRODIURIL) 25 MG tablet, Take 1 tablet by mouth Every Morning., Disp: 90 tablet, Rfl: 3  •  LANTUS 100 UNIT/ML injection, Inject 33 Units under the skin into the appropriate area as directed Every Night., Disp: , Rfl:   •  MAGNESIUM CHLORIDE PO, Take 1 capsule by mouth 2 (Two) Times a Day., Disp: , Rfl:   •  methocarbamol (ROBAXIN) 750 MG tablet, , Disp: , Rfl:   •  nitroglycerin (NITROSTAT) 0.4 MG SL tablet, Place 1 tablet under the tongue Every 5 (Five) Minutes As Needed for Chest Pain. Take no more than 3 doses in 15 minutes., Disp: 25 tablet, Rfl: 5  •  potassium chloride (K-DUR) 10 MEQ CR tablet, 1 tablet PO every other day, Disp: 45 tablet, Rfl: 3  •  sacubitril-valsartan (ENTRESTO)  MG tablet, Take 1 tablet by mouth 2 (Two) Times a Day., Disp: 180 tablet, Rfl: 1  •  traMADol (ULTRAM) 50 MG tablet, Take 50 mg by mouth Every Night., Disp: , Rfl:   •  TURMERIC PO, Take 1 capsule by mouth 2 (Two)  Times a Day., Disp: , Rfl:   •  vitamin E 400 UNIT capsule, Take 400 Units by mouth Every Morning., Disp: , Rfl:   Allergies   Allergen Reactions   • Crestor [Rosuvastatin Calcium] Myalgia   • Influenza Vaccines Other (See Comments)     URI   • Lipitor [Atorvastatin] Myalgia   • Naproxen Other (See Comments)     Edema in left leg mostly    • Niacin And Related Myalgia   • Plavix [Clopidogrel] Myalgia   • Pravachol [Pravastatin Sodium] Myalgia   • Tricor [Fenofibrate] Myalgia   • Zocor [Simvastatin] Myalgia     Social History     Socioeconomic History   • Marital status:      Spouse name: Not on file   • Number of children: 0   • Years of education: Not on file   • Highest education level: Not on file   Occupational History   • 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   Tobacco Use   • Smoking status: Former Smoker     Packs/day: 2.00     Years: 12.00     Pack years: 24.00     Types: Cigarettes     Last attempt to quit: 1973     Years since quittin.3   • Smokeless tobacco: Never Used   • Tobacco comment: just social    Substance and Sexual Activity   • Alcohol use: Yes     Alcohol/week: 3.0 standard drinks     Types: 2 Glasses of wine, 1 Shots of liquor per week     Comment: 2 glasses of wine nightly and occas a shot of whiskey   • Drug use: No   • Sexual activity: Defer     Partners: Female     Comment: spouse   Social History Narrative    Caffeine: 2 servings per day    Patient lives at home with his spouse in [facility]     Family History   Problem Relation Age of Onset   • Other Mother         back pain   • Stroke Mother    • Heart disease Father    • No Known Problems Sister    • No Known Problems Brother    • No Known Problems Maternal Grandmother    • Heart disease Maternal Grandfather    • No Known Problems Paternal Grandmother    • No Known Problems Paternal Grandfather      Review of Systems   Constitution: Negative for chills, fever, malaise/fatigue, night sweats and weight loss.   HENT:  "Negative for congestion, hearing loss, nosebleeds and odynophagia.    Cardiovascular: Positive for dyspnea on exertion. Negative for chest pain, claudication, leg swelling, orthopnea, palpitations and syncope.   Respiratory: Negative for cough, hemoptysis, shortness of breath and wheezing.    Endocrine: Negative for cold intolerance, heat intolerance, polydipsia, polyphagia and polyuria.   Hematologic/Lymphatic: Does not bruise/bleed easily.   Skin: Negative for itching, poor wound healing and rash.   Musculoskeletal: Positive for back pain and neck pain. Negative for arthritis, joint pain, joint swelling and myalgias.   Gastrointestinal: Negative for abdominal pain, constipation, diarrhea, hematemesis, melena, nausea and vomiting.   Genitourinary: Negative for dysuria, frequency, hematuria, nocturia and urgency.   Neurological: Negative for dizziness, light-headedness, loss of balance and numbness.   Psychiatric/Behavioral: Negative for depression and suicidal ideas. The patient is not nervous/anxious.    Allergic/Immunologic: Negative for environmental allergies and HIV exposure.     Vitals:    09/09/20 0823   BP: 140/70   Pulse: 68   Temp: 96.9 °F (36.1 °C)   SpO2: 99%   Weight: 91.2 kg (201 lb)   Height: 170.2 cm (67\")      Physical Exam   Neck: Normal range of motion. Neck supple. No JVD present. No tracheal deviation present. No thyromegaly present.   Cardiovascular: Normal rate and regular rhythm. Exam reveals no gallop and no friction rub.   No murmur heard.  Pulmonary/Chest: No stridor. No respiratory distress. He has no wheezes. He has no rales. He exhibits no tenderness.   Abdominal: Soft. Bowel sounds are normal. There is no tenderness.   Lymphadenopathy:     He has no cervical adenopathy.   The past medical history, surgical history, family history, social history, review of systems and vitals were reviewed by myself and corrected as needed.      Assessment/Plan:   The patient is a 75-year-old male that " returns for a follow-up of peripheral vascular disease and an abdominal aortic aneurysm. The patient's most recent study of his aneurysm was 3.9 cm. We will get another AAA ultrasound in 1 year. His legs appear to be stable and we will get a PV arterial study in 1 year as well.  He is to follow-up with Dr. Mariama Aguiar and Dr. Miramontes.    Patient Active Problem List   Diagnosis   • Coronary artery disease involving native coronary artery of native heart with angina pectoris (CMS/HCC)   • Nonrheumatic aortic valve stenosis   • Atrial flutter (CMS/HCC)   • Abdominal aortic aneurysm (AAA) without rupture (CMS/HCC)   • Essential hypertension   • Hyperlipidemia LDL goal <70   • Type 2 diabetes mellitus without complication, with long-term current use of insulin (CMS/HCC)   • COPD (chronic obstructive pulmonary disease) (CMS/HCC)   • MICHEL (obstructive sleep apnea)   • Thrombocytopenia (CMS/HCC)   • Lumbar stenosis with neurogenic claudication   • Class 1 obesity in adult   • Chronic systolic heart failure (CMS/HCC)   • Vascular claudication (CMS/HCC)   • Peripheral vascular disease (CMS/HCC)     CC: MD Cassie Lang editing for Tres Aguiar MD    I, Tres Aguiar MD, have read and agree with the editing done by Cassie Tao, .

## 2020-09-29 NOTE — PROGRESS NOTES
[facility]  Office Visit Note    DATE: 09/29/2020    IDENTIFICATION: Merlin Pritchard is a 75 y.o. male who resides in [place], [place].    REASON FOR VISIT:  • Coronary artery disease  • Chronic systolic heart failure  • Non-rheumatic aortic valve stenosis  • Hypertension  • Hyperlipidemia            Merlin Pritchard to the office today in follow-up of his HFrEF, coronary artery disease, cardiac risk factors.  The patient states he has been slightly more short of breath than he has been in the past.  He describes occasional orthopnea.  He denies any recent chest pressure with exertion to suggest angina.    Review of Systems   Constitution: Negative for malaise/fatigue.   Eyes: Negative for vision loss in left eye and vision loss in right eye.   Cardiovascular: Positive for dyspnea on exertion and orthopnea. Negative for chest pain, near-syncope, palpitations, paroxysmal nocturnal dyspnea and syncope.   Musculoskeletal: Negative for myalgias.   Neurological: Negative for brief paralysis, excessive daytime sleepiness, focal weakness, numbness, paresthesias and weakness.   All other systems reviewed and are negative.      The patient's past medical, social, family history and ROS reviewed in the patient's electronic medical record.    Allergies   Allergen Reactions   • Crestor [Rosuvastatin Calcium] Myalgia   • Influenza Vaccines Other (See Comments)     URI   • Lipitor [Atorvastatin] Myalgia   • Naproxen Other (See Comments)     Edema in left leg mostly    • Niacin And Related Myalgia   • Plavix [Clopidogrel] Myalgia   • Pravachol [Pravastatin Sodium] Myalgia   • Tricor [Fenofibrate] Myalgia   • Zocor [Simvastatin] Myalgia         Current Outpatient Medications:   •  amLODIPine (NORVASC) 5 MG tablet, TAKE 1 TABLET DAILY, Disp: 90 tablet, Rfl: 3  •  aspirin 325 MG EC tablet, Take 1 tablet by mouth Daily., Disp:  , Rfl:   •  carvedilol (COREG) 25 MG tablet, Take 1 tablet by mouth  2 (Two) Times a Day With Meals., Disp: 180 tablet, Rfl: 3  •  Coenzyme Q10 300 MG capsule, Take 1 capsule by mouth Every Morning., Disp:  , Rfl:   •  fluticasone (FLONASE) 50 MCG/ACT nasal spray, 2 sprays into each nostril Every Night., Disp: , Rfl:   •  furosemide (LASIX) 40 MG tablet, 1 tablet PO every other day, Disp: 45 tablet, Rfl: 3  •  hydrochlorothiazide (HYDRODIURIL) 25 MG tablet, Take 1 tablet by mouth Every Morning., Disp: 90 tablet, Rfl: 3  •  insulin glargine (Lantus) 100 UNIT/ML injection, Inject 32 Units under the skin into the appropriate area as directed Every Night., Disp: , Rfl:   •  insulin lispro (HumaLOG) 100 UNIT/ML injection, Inject 5-20 Units under the skin into the appropriate area as directed 3 (Three) Times a Day. 10-20 units before each meal.  Sliding scale, Disp: , Rfl:   •  MAGNESIUM CHLORIDE PO, Take 1 capsule by mouth 2 (Two) Times a Day., Disp: , Rfl:   •  methocarbamol (ROBAXIN) 750 MG tablet, Take 750 mg by mouth 3 (Three) Times a Day As Needed., Disp: , Rfl:   •  nitroglycerin (NITROSTAT) 0.4 MG SL tablet, Place 1 tablet under the tongue Every 5 (Five) Minutes As Needed for Chest Pain. Take no more than 3 doses in 15 minutes., Disp: 25 tablet, Rfl: 5  •  potassium chloride (K-DUR) 10 MEQ CR tablet, 1 tablet PO every other day, Disp: 45 tablet, Rfl: 3  •  sacubitril-valsartan (ENTRESTO)  MG tablet, Take 1 tablet by mouth 2 (Two) Times a Day., Disp: 180 tablet, Rfl: 1  •  traMADol (ULTRAM) 50 MG tablet, Take 50 mg by mouth Every Night., Disp: , Rfl:   •  TURMERIC PO, Take 1 capsule by mouth 2 (Two) Times a Day., Disp: , Rfl:   •  vitamin E 400 UNIT capsule, Take 400 Units by mouth Every Morning., Disp: , Rfl:   •  Empagliflozin (Jardiance) 10 MG tablet, Take 10 mg by mouth Daily., Disp: 90 tablet, Rfl: 1  No current facility-administered medications for this visit.     Past Medical History:   Diagnosis Date   • AAA (abdominal aortic aneurysm) (CMS/[facility]) 02/28/2017    under  observation    • Atrial flutter (CMS/HCC) 02/28/2017   • Back pain    • CAD (coronary artery disease)    • Cancer (CMS/[facility])     basal cell removed from left ear   1998- had to do 3 separate times to get completely gone    • Cataract     bilat - mild - in center- still present    • CHF (congestive heart failure) (CMS/[facility])    • Coronary artery disease    • Dyslipidemia 2/28/2017   • Heartburn     depending on what eat- no major issue    • Hiatal hernia    • Hypertension 2/28/2017   • IDDM (insulin dependent diabetes mellitus) 2/28/2017    checks sugar 6 times per day    • MI (myocardial infarction) (CMS/[facility])     june 87   • Numbness and tingling     from wasit down from back issues    • MICHEL (obstructive sleep apnea) 02/28/2017    20s years ago-  pt believes only had this when used statins so unsure why in system since no real issues- no machine or o2    • Thrombocytopenia (CMS/[facility]) 2/28/2017   • Tooth wear     tooth implant -  2-3 in from bottom    • VHD (valvular heart disease) 2/28/2017   • Wears glasses        Past Surgical History:   Procedure Laterality Date   • APPENDECTOMY     • BACK SURGERY      lumbar lami   • BASAL CELL CARCINOMA EXCISION       from face.-  mole procedure then had to have several other times to get completely removed    • CARDIAC CATHETERIZATION     • CARDIAC CATHETERIZATION N/A 6/11/2018    Procedure: Left Heart Cath;  Surgeon: Cordell Miramontes IV, MD;  Location:  [facility];  Service: Cardiovascular   • CARDIAC CATHETERIZATION N/A 6/11/2018    Procedure: Angioplasty-coronary;  Surgeon: Cordell Miramontes IV, MD;  Location:  [facility];  Service: Cardiovascular   • CATARACT EXTRACTION, BILATERAL     • COLONOSCOPY     • CORONARY ARTERY BYPASS GRAFT  03/22/2004    x4. redo 2-vessel CABG in 2012   • ENDOSCOPY     • INTERVENTIONAL RADIOLOGY PROCEDURE N/A 9/19/2017    Procedure: IR myelogram lumbar spine;  Surgeon: Jay Ellison MD;  Location:  " [facility];  Service:    • LUMBAR DISCECTOMY N/A 10/25/2017    Procedure: lumbar foraminotomy L4 5;  Surgeon: Justin Buchanan MD;  Location: [facility] OR;  Service:    • MITRAL VALVE ANNULOPLASTY  2012   • TONSILLECTOMY AND ADENOIDECTOMY      4-4 yo   • VASECTOMY     • WISDOM TOOTH EXTRACTION      - all 4       Family History   Problem Relation Age of Onset   • Other Mother         back pain   • Stroke Mother    • Heart disease Father    • No Known Problems Sister    • No Known Problems Brother    • No Known Problems Maternal Grandmother    • Heart disease Maternal Grandfather    • No Known Problems Paternal Grandmother    • No Known Problems Paternal Grandfather    • No Known Problems Sister        Social History     Tobacco Use   • Smoking status: Former Smoker     Packs/day: 2.00     Years: 12.00     Pack years: 24.00     Types: Cigarettes     Quit date: 1973     Years since quittin.4   • Smokeless tobacco: Never Used   • Tobacco comment: just social    Substance Use Topics   • Alcohol use: Yes     Alcohol/week: 3.0 standard drinks     Types: 2 Glasses of wine, 1 Shots of liquor per week     Comment: 2 glasses of wine nightly and occas a shot of whiskey           Blood pressure 118/62, pulse 62, temperature 96.9 °F (36.1 °C), height 170.2 cm (67\"), weight 93.7 kg (206 lb 9.6 oz), SpO2 96 %.  Body mass index is 32.36 kg/m².  Vitals:    20 0938   Patient Position: Sitting       Constitutional:       Appearance: Well-developed.   Eyes:      General: No scleral icterus.     Pupils: Pupils are equal, round, and reactive to light.   HENT:      Head: Normocephalic and atraumatic.   Neck:      Thyroid: No thyromegaly.      Vascular: No carotid bruit or JVD.   Pulmonary:      Effort: Pulmonary effort is normal.      Breath sounds: Normal breath sounds.   Cardiovascular:      Normal rate. Regular rhythm.      Murmurs: There is no murmur.      No gallop.   Abdominal:      " Palpations: Abdomen is soft. There is no abdominal mass.      Tenderness: There is no abdominal tenderness.   Musculoskeletal:      Extremities: No clubbing present.  Skin:     General: Skin is warm and dry. There is no cyanosis.   Neurological:      Mental Status: Alert and oriented to person, place, and time.   Psychiatric:         Behavior: Behavior normal.         Data Review (reviewed with patient):     Procedures           Problem List Items Addressed This Visit        Cardiology Problems    Chronic systolic heart failure (CMS/[facility])    Overview     · LVEF 25%, (4/2012)  · Echo (5/22/2018):  LVEF 50%.  Mild to moderate aortic stenosis.  Mild MR  · Echo (9/29/2020): LVEF 45%.  Left atrial dilation.  Moderate aortic stenosis (mean gradient 22 mmHg).  Moderate MR.         Current Assessment & Plan     · Stage C HFrEF with normalized low normal LVEF  · Slightly worsening shortness of breath of unclear etiology--normal proBNP today  · Start Jardiance for diabetes and heart failure  · Continue Entresto and carvedilol         Relevant Medications    carvedilol (COREG) 25 MG tablet    sacubitril-valsartan (ENTRESTO)  MG tablet    nitroglycerin (NITROSTAT) 0.4 MG SL tablet    Empagliflozin (Jardiance) 10 MG tablet    Other Relevant Orders    proBNP (Completed)    Coronary artery disease involving native coronary artery of native heart with angina pectoris (CMS/[facility])    Overview     · Myocardial infarction, 1987, data deficit.  · CABG by Loi Kiser (3/22/2004):  LIMA graft to LAD. SVG to PDA. Sequential SVG to diagonal and obtuse marginal.   · Redo CABG by Gerardo Aguiar (4/17/2012): SVG to left circumflex. SVG to PDA. Mitral annuloplasty.  · Pharmacologic nuclear stress (5/22/18): Mostly fixed anterior apical and inferior lateral defects.  LVEF 49%.  · Cardiac catheterization (6/11/2018): Severe native 3-vessel CAD (diagonal branch of LAD, left circumflex, RCA). 3 grafts patent (LIMA to LAD, SVG to OM, SVG to  RPDA). Successful balloon angioplasty without stenting of the SVG to OM).         Current Assessment & Plan     · No anginal symptoms  · Continue aspirin beta-blocker and statin therapy           Relevant Medications    carvedilol (COREG) 25 MG tablet    sacubitril-valsartan (ENTRESTO)  MG tablet    nitroglycerin (NITROSTAT) 0.4 MG SL tablet    aspirin 325 MG EC tablet    Other Relevant Orders    Comprehensive Metabolic Panel (Completed)    CBC (No Diff) (Completed)    Nonrheumatic aortic valve stenosis - Primary    Overview     · Mitral ring annuloplasty for severe MR by Gerardo Aguiar, 4/17/2012   · Echocardiogram  (4/25/2013):  LVEF 55%; mild aortic stenosis and regurgitation.  · Echo (5/9/17): LVEF 55%.  Mild aortic stenosis.  Mitral annuloplasty band noted with moderate MR.  · Echo (5/22/2018):  LVEF 50%.  Mild to moderate aortic stenosis.  Mild MR  · Echo (9/29/2020): LVEF 45%.  Left atrial dilation.  Moderate aortic stenosis (mean gradient 22 mmHg).  Moderate MR.         Current Assessment & Plan     · Moderate aortic stenosis not severe enough to result in symptoms  · Repeat echo in 2022         Relevant Medications    carvedilol (COREG) 25 MG tablet    sacubitril-valsartan (ENTRESTO)  MG tablet    nitroglycerin (NITROSTAT) 0.4 MG SL tablet    Essential hypertension    Overview     · Negative renal artery duplex, 06/28/2013         Current Assessment & Plan     · Well-controlled  · Continue present medical therapy         Relevant Medications    carvedilol (COREG) 25 MG tablet    Hyperlipidemia LDL goal <70    Overview     · Intolerance to multiple statins (Lipitor, Zocor, Crestor, Tricor, Niacin)         Relevant Medications    Coenzyme Q10 300 MG capsule       Other    Type 2 diabetes mellitus without complication, with long-term current use of insulin (CMS/[facility])    Current Assessment & Plan     · Start Jardiance for diabetes and CV risk reduction         Relevant Medications    Empagliflozin  (Jardiance) 10 MG tablet    insulin lispro (HumaLOG) 100 UNIT/ML injection    insulin glargine (Lantus) 100 UNIT/ML injection               · proBNP, CBC, BMP (already done)  · Start Jardiance 10 mg daily  · Patient to contact office if symptoms worsen  Return in about 6 months (around 3/29/2021).      Cordell Miramontes IV MD, [facility]  9/29/2020

## 2020-09-29 NOTE — ASSESSMENT & PLAN NOTE
· Stage C HFrEF with normalized low normal LVEF  · Slightly worsening shortness of breath of unclear etiology--normal proBNP today  · Start Jardiance for diabetes and heart failure  · Continue Entresto and carvedilol

## (undated) DEVICE — PAD ARMBRD SURG CONVOL 7.5X20X2IN

## (undated) DEVICE — DRP MICROSCOP ELIMINATES GLAS COVR

## (undated) DEVICE — TREK CORONARY DILATATION CATHETER 2.50 MM X 12 MM / RAPID-EXCHANGE: Brand: TREK

## (undated) DEVICE — 3M™ STERI-STRIP™ ANTIMICROBIAL SKIN CLOSURES 1/2 INCH X 4 INCHES 50/CARTON 4 CARTONS/CASE A1847: Brand: 3M™ STERI-STRIP™

## (undated) DEVICE — SUT VIC 0 UR6 27IN VCP603H

## (undated) DEVICE — TUBING, SUCTION, 1/4" X 10', STRAIGHT: Brand: MEDLINE

## (undated) DEVICE — SEALANT HEMOS FLOSEAL MATRX W/MALL TP 10ML

## (undated) DEVICE — MEDI-VAC NON-CONDUCTIVE SUCTION TUBING: Brand: CARDINAL HEALTH

## (undated) DEVICE — ENCORE® LATEX MICRO SIZE 8, STERILE LATEX POWDER-FREE SURGICAL GLOVE: Brand: ENCORE

## (undated) DEVICE — Device

## (undated) DEVICE — NDL SPINE 22G 5IN BLK

## (undated) DEVICE — 3M™ STERI-DRAPE™ FLUOROSCOPE DRAPE, 10 PER CARTON / 4 CARTONS PER CASE, 1012: Brand: STERI-DRAPE™

## (undated) DEVICE — C-ARM: Brand: UNBRANDED

## (undated) DEVICE — DRP MICROSCP LEICA W/GLASS LENS

## (undated) DEVICE — EMBOLIC PROTECTION SYSTEM: Brand: FILTERWIRE EZ™

## (undated) DEVICE — ANTIBACTERIAL UNDYED BRAIDED (POLYGLACTIN 910), SYNTHETIC ABSORBABLE SUTURE: Brand: COATED VICRYL

## (undated) DEVICE — RADIFOCUS GLIDEWIRE: Brand: GLIDEWIRE

## (undated) DEVICE — GUIDE CATHETER: Brand: MACH1™

## (undated) DEVICE — TRY L/P SFTY A/20G

## (undated) DEVICE — MODEL AT P65, P/N 701554-001KIT CONTENTS: HAND CONTROLLER, 3-WAY HIGH-PRESSURE STOPCOCK WITH ROTATING END AND PREMIUM HIGH-PRESSURE TUBING: Brand: ANGIOTOUCH® KIT

## (undated) DEVICE — 3M™ STERI-DRAPE™ INSTRUMENT POUCH 1018: Brand: STERI-DRAPE™

## (undated) DEVICE — GLV SURG BIOGEL LTX PF 8

## (undated) DEVICE — PK CATH CARD 10

## (undated) DEVICE — CATH DIAG EXPO M/ PK 6FR FL4/FR4 PIG 3PK

## (undated) DEVICE — RUBBERBAND LF STRL PK/2

## (undated) DEVICE — SYR CONTRL LUERLOK 10CC

## (undated) DEVICE — HDRST INTUB GENTLETOUCH SLOT 7IN RT

## (undated) DEVICE — CANNULA,OXY,ADULT,SUPERSOFT,W/7'TUB,UC: Brand: MEDLINE

## (undated) DEVICE — DRSNG SURESITE WNDW 2.38X2.75

## (undated) DEVICE — CATH DIAG IMPULSE IMT 6F 100CM

## (undated) DEVICE — SNAP KOVER: Brand: UNBRANDED

## (undated) DEVICE — MODEL BT2000 P/N 700287-012KIT CONTENTS: MANIFOLD WITH SALINE AND CONTRAST PORTS, SALINE TUBING WITH SPIKE AND HAND SYRINGE, TRANSDUCER: Brand: BT2000 AUTOMATED MANIFOLD KIT

## (undated) DEVICE — MEDI-VAC YANKAUER SUCTION HANDLE W/BULBOUS TIP: Brand: CARDINAL HEALTH

## (undated) DEVICE — COVER,LIGHT HANDLE,FLX,1/PK: Brand: MEDLINE INDUSTRIES, INC.

## (undated) DEVICE — AIRWY 90MM NO9

## (undated) DEVICE — DRSNG WND GZ PAD BORDERED 4X8IN STRL

## (undated) DEVICE — DEV COMP RAD PRELUDESYNC 24CM

## (undated) DEVICE — INTRO SHEATH PRELUDE IDEAL SPRNG COIL 021 6F 23X80CM

## (undated) DEVICE — 3M™ STERI-STRIP™ REINFORCED ADHESIVE SKIN CLOSURES, R1547, 1/2 IN X 4 IN (12 MM X 100 MM), 6 STRIPS/ENVELOPE: Brand: 3M™ STERI-STRIP™

## (undated) DEVICE — NDL SPINE 22G 31/2IN BLK

## (undated) DEVICE — ADHS LIQ MASTISOL 2/3ML

## (undated) DEVICE — NDL HYPO ECLPS SFTY 25G 1 1/2IN

## (undated) DEVICE — NDL SPINE 20G 3 1/2 YEL STRL 1P/U

## (undated) DEVICE — APPL CHLORAPREP W/TINT 26ML BLU

## (undated) DEVICE — ELECTRD BLD EXT EDGE/INSUL 6IN

## (undated) DEVICE — SPNG GZ WOVN 4X4IN 12PLY 10/BX STRL

## (undated) DEVICE — PAD GRND REM POLYHESIVE A/ DISP

## (undated) DEVICE — COVADERM: Brand: DEROYAL

## (undated) DEVICE — TOOL T12MH25M LEGEND 12CM 2.5MM MH 2FLT: Brand: MIDAS REX ™

## (undated) DEVICE — PK NEURO DISC 10

## (undated) DEVICE — MAGNETIC DRAPE: Brand: DEVON

## (undated) DEVICE — ULTRACLEAN ACCESSORY ELECTRODE 6" (15.24 CM) COATED BLADE: Brand: ULTRACLEAN